# Patient Record
Sex: MALE | Race: WHITE | Employment: OTHER | URBAN - METROPOLITAN AREA
[De-identification: names, ages, dates, MRNs, and addresses within clinical notes are randomized per-mention and may not be internally consistent; named-entity substitution may affect disease eponyms.]

---

## 2019-08-08 ENCOUNTER — OFFICE VISIT (OUTPATIENT)
Dept: FAMILY MEDICINE CLINIC | Age: 74
End: 2019-08-08
Payer: MEDICARE

## 2019-08-08 VITALS
WEIGHT: 181.1 LBS | HEART RATE: 66 BPM | HEIGHT: 74 IN | SYSTOLIC BLOOD PRESSURE: 124 MMHG | OXYGEN SATURATION: 97 % | BODY MASS INDEX: 23.24 KG/M2 | DIASTOLIC BLOOD PRESSURE: 70 MMHG

## 2019-08-08 DIAGNOSIS — G25.0 BENIGN ESSENTIAL TREMOR: Primary | ICD-10-CM

## 2019-08-08 DIAGNOSIS — N40.1 BENIGN PROSTATIC HYPERPLASIA WITH URINARY OBSTRUCTION: ICD-10-CM

## 2019-08-08 DIAGNOSIS — Z86.010 HX OF ADENOMATOUS COLONIC POLYPS: ICD-10-CM

## 2019-08-08 DIAGNOSIS — N13.8 BENIGN PROSTATIC HYPERPLASIA WITH URINARY OBSTRUCTION: ICD-10-CM

## 2019-08-08 DIAGNOSIS — Z90.49 HISTORY OF RESECTION OF LARGE BOWEL: ICD-10-CM

## 2019-08-08 PROCEDURE — 1123F ACP DISCUSS/DSCN MKR DOCD: CPT | Performed by: FAMILY MEDICINE

## 2019-08-08 PROCEDURE — G8427 DOCREV CUR MEDS BY ELIG CLIN: HCPCS | Performed by: FAMILY MEDICINE

## 2019-08-08 PROCEDURE — 99203 OFFICE O/P NEW LOW 30 MIN: CPT | Performed by: FAMILY MEDICINE

## 2019-08-08 PROCEDURE — G8420 CALC BMI NORM PARAMETERS: HCPCS | Performed by: FAMILY MEDICINE

## 2019-08-08 PROCEDURE — 4040F PNEUMOC VAC/ADMIN/RCVD: CPT | Performed by: FAMILY MEDICINE

## 2019-08-08 PROCEDURE — 1036F TOBACCO NON-USER: CPT | Performed by: FAMILY MEDICINE

## 2019-08-08 PROCEDURE — 3017F COLORECTAL CA SCREEN DOC REV: CPT | Performed by: FAMILY MEDICINE

## 2019-08-08 ASSESSMENT — PATIENT HEALTH QUESTIONNAIRE - PHQ9
SUM OF ALL RESPONSES TO PHQ QUESTIONS 1-9: 0
2. FEELING DOWN, DEPRESSED OR HOPELESS: 0
1. LITTLE INTEREST OR PLEASURE IN DOING THINGS: 0
SUM OF ALL RESPONSES TO PHQ9 QUESTIONS 1 & 2: 0
SUM OF ALL RESPONSES TO PHQ QUESTIONS 1-9: 0

## 2019-08-08 ASSESSMENT — ENCOUNTER SYMPTOMS
ABDOMINAL PAIN: 0
COUGH: 0

## 2019-08-08 NOTE — Clinical Note
Call dr Anne Buckley and get last cscope result to satisfy HM, also sravanthi next years visit as [address] VISIT please

## 2019-09-23 ENCOUNTER — OFFICE VISIT (OUTPATIENT)
Dept: FAMILY MEDICINE CLINIC | Age: 74
End: 2019-09-23
Payer: MEDICARE

## 2019-09-23 VITALS
DIASTOLIC BLOOD PRESSURE: 72 MMHG | SYSTOLIC BLOOD PRESSURE: 104 MMHG | WEIGHT: 179 LBS | BODY MASS INDEX: 22.98 KG/M2 | HEART RATE: 78 BPM | OXYGEN SATURATION: 96 %

## 2019-09-23 DIAGNOSIS — H25.013 CORTICAL AGE-RELATED CATARACT OF BOTH EYES: ICD-10-CM

## 2019-09-23 DIAGNOSIS — Z01.818 PREOPERATIVE CLEARANCE: Primary | ICD-10-CM

## 2019-09-23 PROCEDURE — G8420 CALC BMI NORM PARAMETERS: HCPCS | Performed by: FAMILY MEDICINE

## 2019-09-23 PROCEDURE — G8427 DOCREV CUR MEDS BY ELIG CLIN: HCPCS | Performed by: FAMILY MEDICINE

## 2019-09-23 PROCEDURE — 99212 OFFICE O/P EST SF 10 MIN: CPT | Performed by: FAMILY MEDICINE

## 2019-09-23 NOTE — PROGRESS NOTES
Candehaleigh Godfrey  1945 09/24/19    Chief Complaint   Patient presents with    Pre-op Exam     for ctaract surgery           Patient here for preoperative visit for cataract surgery, next wk, doing fine and has no complaints. Denies chest pain, dyspnea, edema, or TIA's.         Past Medical History:   Diagnosis Date    Benign essential tremor     COPD (chronic obstructive pulmonary disease) ([address]     per CXR    Hyperlipidemia     borderline     Past Surgical History:   Procedure Laterality Date    APPENDECTOMY      COLON SURGERY      COLONOSCOPY  4-29-14    s/p ileocecal resecectio withbx    CYSTOSCOPY  12-06-11    HYSTERECTOMY      INSERT/CHANGE LANDAVERDE CATHETER - SIMPLE  12-06-11    OTHER SURGICAL HISTORY  12-06-11    laprascopic ileocecectomy    OTHER SURGICAL HISTORY  2017    UROLIFT Dr Love Core SHOULDER ARTHROSCOPY  1999    right    TURP      x 2 (last one 2004)     Family History   Problem Relation Age of Onset    High Blood Pressure Mother     Other Father         dona gehrig's disease     Social History     Socioeconomic History    Marital status:      Spouse name: Not on file    Number of children: Not on file    Years of education: Not on file    Highest education level: Not on file   Occupational History    Not on file   Social Needs    Financial resource strain: Not on file    Food insecurity:     Worry: Not on file     Inability: Not on file    Transportation needs:     Medical: Not on file     Non-medical: Not on file   Tobacco Use    Smoking status: Never Smoker    Smokeless tobacco: Never Used   Substance and Sexual Activity    Alcohol use: No    Drug use: No    Sexual activity: Not on file   Lifestyle    Physical activity:     Days per week: Not on file     Minutes per session: Not on file    Stress: Not on file   Relationships    Social connections:     Talks on phone: Not on file     Gets together: Not on file     Attends Confucianism service: Not on file Active member of club or organization: Not on file     Attends meetings of clubs or organizations: Not on file     Relationship status: Not on file    Intimate partner violence:     Fear of current or ex partner: Not on file     Emotionally abused: Not on file     Physically abused: Not on file     Forced sexual activity: Not on file   Other Topics Concern    Not on file   Social History Narrative    Not on file       No Known Allergies  Current Outpatient Medications   Medication Sig Dispense Refill    Dutasteride-Tamsulosin HCl (RAVI) 0.5-0.4 MG CAPS Take 1 tablet by mouth daily.  therapeutic multivitamin-minerals (THERAGRAN-M) tablet Take 1 tablet by mouth daily. Last dose 11/28/11. No current facility-administered medications for this visit. Review of Systems   Constitutional: Negative for activity change, appetite change, chills, fatigue and fever. HENT: Negative for congestion, postnasal drip, sinus pressure and sore throat. Respiratory: Negative for cough, chest tightness, shortness of breath and wheezing. Cardiovascular: Negative for chest pain and leg swelling. Gastrointestinal: Negative for abdominal pain, constipation and diarrhea. Genitourinary: Negative for dysuria and frequency. Musculoskeletal: Negative for back pain and gait problem. Skin: Negative for rash. Neurological: Negative for dizziness, weakness and headaches. Psychiatric/Behavioral: Negative for agitation and behavioral problems. The patient is not nervous/anxious.         Lab Results   Component Value Date    WBC 8.2 12/11/2011    HGB 13.5 12/11/2011    HCT 40.4 (L) 12/11/2011    MCV 88.2 12/11/2011     12/11/2011     Lab Results   Component Value Date     12/07/2011    K 4.2 12/07/2011     12/07/2011    CO2 28 12/07/2011    BUN 17 12/07/2011    CREATININE 0.9 12/07/2011    GLUCOSE 125 12/07/2011    CALCIUM 8.4 12/07/2011    LABGLOM >60 12/07/2011    GFRAA >60 12/07/2011

## 2019-09-24 ASSESSMENT — ENCOUNTER SYMPTOMS
SHORTNESS OF BREATH: 0
BACK PAIN: 0
SINUS PRESSURE: 0
CONSTIPATION: 0
CHEST TIGHTNESS: 0
ABDOMINAL PAIN: 0
DIARRHEA: 0
WHEEZING: 0
SORE THROAT: 0
COUGH: 0

## 2020-08-13 ENCOUNTER — OFFICE VISIT (OUTPATIENT)
Dept: FAMILY MEDICINE CLINIC | Age: 75
End: 2020-08-13
Payer: MEDICARE

## 2020-08-13 VITALS
WEIGHT: 176 LBS | DIASTOLIC BLOOD PRESSURE: 82 MMHG | SYSTOLIC BLOOD PRESSURE: 122 MMHG | OXYGEN SATURATION: 94 % | BODY MASS INDEX: 22.6 KG/M2 | HEART RATE: 81 BPM

## 2020-08-13 PROCEDURE — 4040F PNEUMOC VAC/ADMIN/RCVD: CPT | Performed by: FAMILY MEDICINE

## 2020-08-13 PROCEDURE — 1123F ACP DISCUSS/DSCN MKR DOCD: CPT | Performed by: FAMILY MEDICINE

## 2020-08-13 PROCEDURE — 3017F COLORECTAL CA SCREEN DOC REV: CPT | Performed by: FAMILY MEDICINE

## 2020-08-13 PROCEDURE — G0438 PPPS, INITIAL VISIT: HCPCS | Performed by: FAMILY MEDICINE

## 2020-08-13 RX ORDER — ZOSTER VACCINE RECOMBINANT, ADJUVANTED 50 MCG/0.5
0.5 KIT INTRAMUSCULAR SEE ADMIN INSTRUCTIONS
Qty: 0.5 ML | Refills: 0 | Status: SHIPPED | OUTPATIENT
Start: 2020-08-13 | End: 2021-02-09

## 2020-08-13 ASSESSMENT — LIFESTYLE VARIABLES: HOW OFTEN DO YOU HAVE A DRINK CONTAINING ALCOHOL: 0

## 2020-08-13 ASSESSMENT — PATIENT HEALTH QUESTIONNAIRE - PHQ9
SUM OF ALL RESPONSES TO PHQ QUESTIONS 1-9: 0
SUM OF ALL RESPONSES TO PHQ QUESTIONS 1-9: 0

## 2020-08-13 NOTE — PATIENT INSTRUCTIONS
Personalized Preventive Plan for Eddye Moder - 8/13/2020  Medicare offers a range of preventive health benefits. Some of the tests and screenings are paid in full while other may be subject to a deductible, co-insurance, and/or copay. Some of these benefits include a comprehensive review of your medical history including lifestyle, illnesses that may run in your family, and various assessments and screenings as appropriate. After reviewing your medical record and screening and assessments performed today your provider may have ordered immunizations, labs, imaging, and/or referrals for you. A list of these orders (if applicable) as well as your Preventive Care list are included within your After Visit Summary for your review. Other Preventive Recommendations:    · A preventive eye exam performed by an eye specialist is recommended every 1-2 years to screen for glaucoma; cataracts, macular degeneration, and other eye disorders. · A preventive dental visit is recommended every 6 months. · Try to get at least 150 minutes of exercise per week or 10,000 steps per day on a pedometer . · Order or download the FREE \"Exercise & Physical Activity: Your Everyday Guide\" from [employer]. Call 6-121.413.2854 or search [employer] online. · You need 4928-8100 mg of calcium and 0979-8866 IU of vitamin D per day. It is possible to meet your calcium requirement with diet alone, but a vitamin D supplement is usually necessary to meet this goal.  · When exposed to the sun, use a sunscreen that protects against both UVA and UVB radiation with an SPF of 30 or greater. Reapply every 2 to 3 hours or after sweating, drying off with a towel, or swimming. · Always wear a seat belt when traveling in a car. Always wear a helmet when riding a bicycle or motorcycle.

## 2020-08-13 NOTE — PROGRESS NOTES
Medicare Annual Wellness Visit  Name: Leonides Coles Date: 2020   MRN: [id number] Sex: Male   Age: 69 y.o. Ethnicity: Non-/Non    : 1945 Race: Esha Santana is here for Medicare AWV and Otalgia (mostly left ear with pressure)    Screenings for behavioral, psychosocial and functional/safety risks, and cognitive dysfunction are all negative except as indicated below. These results, as well as other patient data from the [address] form, are documented in Flowsheets linked to this Encounter. Pertinent Positives: unintentional weight loss; living will on file. Due for pneumovax. No Known Allergies      Prior to Visit Medications    Medication Sig Taking? Authorizing Provider   zoster recombinant adjuvanted vaccine [facility]) 50 MCG/0.5ML SUSR injection Inject 0.5 mLs into the muscle See Admin Instructions 1 dose now and repeat in 2-6 months Yes Stacie Driscoll MD   Dutasteride-Tamsulosin HCl (RAVI) 0.5-0.4 MG CAPS Take 1 tablet by mouth daily. Yes Historical Provider, MD   therapeutic multivitamin-minerals (THERAGRAN-M) tablet Take 1 tablet by mouth daily. Last dose 11.   Yes Historical Provider, MD         Past Medical History:   Diagnosis Date    Benign essential tremor     [address] (chronic obstructive pulmonary disease) ([facility]     per CXR    Hyperlipidemia     borderline       Past Surgical History:   Procedure Laterality Date    APPENDECTOMY      COLON SURGERY      COLONOSCOPY  14    s/p ileocecal resecectio withbx    CYSTOSCOPY  11    HYSTERECTOMY      INSERT/CHANGE LANDAVERDE CATHETER - SIMPLE  11    OTHER SURGICAL HISTORY  11    laprascopic ileocecectomy    OTHER SURGICAL HISTORY  2017    UROLIFT Dr Hilda Garrison SHOULDER ARTHROSCOPY      right    TURP      x 2 (last one )         Family History   Problem Relation Age of Onset    High Blood Pressure Mother     Other Father         dona gehrig's disease CareTeam (Including outside providers/suppliers regularly involved in providing care):   Patient Care Team:  Reina Henry MD as PCP - General (Family Medicine)  Reina Henry MD as PCP - [facility] Provider    Wt Readings from Last 3 Encounters:   08/13/20 176 lb (79.8 kg)   09/23/19 179 lb (81.2 kg)   08/08/19 181 lb 1.6 oz (82.1 kg)     Vitals:    08/13/20 1327   BP: 122/82   Site: Left Upper Arm   Position: Sitting   Cuff Size: Medium Adult   Pulse: 81   SpO2: 94%   Weight: 176 lb (79.8 kg)     Body mass index is 22.6 kg/m². Based upon direct observation of the patient, evaluation of cognition reveals recent and remote memory intact. Patient's complete Health Risk Assessment and screening values have been reviewed and are found in Flowsheets. The following problems were reviewed today and where indicated follow up appointments were made and/or referrals ordered. Positive Risk Factor Screenings with Interventions:     Health Habits/Nutrition:  Health Habits/Nutrition  Do you exercise for at least 20 minutes 2-3 times per week?: Yes  Have you lost any weight without trying in the past 3 months?: (!) Yes  Do you eat fewer than 2 meals per day?: No  Have you seen a dentist within the past year?: Yes  Body mass index is 22.6 kg/m².   Health Habits/Nutrition Interventions:  · Nutritional issues:  educational materials to promote weight loss provided    Hearing/Vision:  No exam data present  Hearing/Vision  Do you or your family notice any trouble with your hearing?: (!) Yes  Do you have difficulty driving, watching TV, or doing any of your daily activities because of your eyesight?: No  Have you had an eye exam within the past year?: Yes  Hearing/Vision Interventions:  · Hearing concerns:  offerred referral to audiology    Personalized Preventive Plan   Current Health Maintenance Status  Immunization History   Administered Date(s) Administered    Pneumococcal Polysaccharide ([id number]) 08/17/2020

## 2020-08-17 PROCEDURE — 90732 PPSV23 VACC 2 YRS+ SUBQ/IM: CPT | Performed by: FAMILY MEDICINE

## 2020-08-17 PROCEDURE — G0009 ADMIN PNEUMOCOCCAL VACCINE: HCPCS | Performed by: FAMILY MEDICINE

## 2021-05-25 ENCOUNTER — OFFICE VISIT (OUTPATIENT)
Dept: FAMILY MEDICINE CLINIC | Age: 76
End: 2021-05-25
Payer: MEDICARE

## 2021-05-25 VITALS
SYSTOLIC BLOOD PRESSURE: 126 MMHG | DIASTOLIC BLOOD PRESSURE: 80 MMHG | BODY MASS INDEX: 23.11 KG/M2 | HEART RATE: 80 BPM | OXYGEN SATURATION: 96 % | WEIGHT: 180 LBS

## 2021-05-25 DIAGNOSIS — H68.012 EUSTACHIAN SALPINGITIS, ACUTE, LEFT: ICD-10-CM

## 2021-05-25 DIAGNOSIS — H93.8X2 EAR FULLNESS, LEFT: Primary | ICD-10-CM

## 2021-05-25 DIAGNOSIS — Z12.11 SCREENING FOR COLON CANCER: ICD-10-CM

## 2021-05-25 PROCEDURE — 1036F TOBACCO NON-USER: CPT | Performed by: FAMILY MEDICINE

## 2021-05-25 PROCEDURE — G8420 CALC BMI NORM PARAMETERS: HCPCS | Performed by: FAMILY MEDICINE

## 2021-05-25 PROCEDURE — 3017F COLORECTAL CA SCREEN DOC REV: CPT | Performed by: FAMILY MEDICINE

## 2021-05-25 PROCEDURE — 99213 OFFICE O/P EST LOW 20 MIN: CPT | Performed by: FAMILY MEDICINE

## 2021-05-25 PROCEDURE — 4040F PNEUMOC VAC/ADMIN/RCVD: CPT | Performed by: FAMILY MEDICINE

## 2021-05-25 PROCEDURE — 1123F ACP DISCUSS/DSCN MKR DOCD: CPT | Performed by: FAMILY MEDICINE

## 2021-05-25 PROCEDURE — G8427 DOCREV CUR MEDS BY ELIG CLIN: HCPCS | Performed by: FAMILY MEDICINE

## 2021-05-25 RX ORDER — FLUTICASONE PROPIONATE 50 MCG
2 SPRAY, SUSPENSION (ML) NASAL DAILY
Qty: 3 BOTTLE | Refills: 1 | Status: SHIPPED | OUTPATIENT
Start: 2021-05-25

## 2021-05-25 RX ORDER — FLUTICASONE PROPIONATE 50 MCG
2 SPRAY, SUSPENSION (ML) NASAL DAILY
Qty: 3 BOTTLE | Refills: 1 | Status: SHIPPED | OUTPATIENT
Start: 2021-05-25 | End: 2021-05-25 | Stop reason: SDUPTHER

## 2021-05-25 ASSESSMENT — PATIENT HEALTH QUESTIONNAIRE - PHQ9
SUM OF ALL RESPONSES TO PHQ QUESTIONS 1-9: 0
1. LITTLE INTEREST OR PLEASURE IN DOING THINGS: 0
2. FEELING DOWN, DEPRESSED OR HOPELESS: 0
SUM OF ALL RESPONSES TO PHQ QUESTIONS 1-9: 0
SUM OF ALL RESPONSES TO PHQ9 QUESTIONS 1 & 2: 0
SUM OF ALL RESPONSES TO PHQ QUESTIONS 1-9: 0

## 2021-05-25 NOTE — PROGRESS NOTES
Chief Complaint   Patient presents with    Otalgia     left ear x 3 weeks pressure and muffled hearing        [place] NARRATIVE:    Pressure without sharp pain. No purulent drainage from nose or ears nor cough nor sneezing. No fever. Patient is established unless otherwise noted. Above chief complaint and [place] obtained by this physician provider. Review of Systems   Constitutional: Negative for activity change, chills, fatigue and fever. HENT: Positive for hearing loss. Negative for congestion and ear pain (left ear pressure though). Respiratory: Negative for cough, chest tightness, shortness of breath and wheezing. Cardiovascular: Negative for chest pain and leg swelling. Gastrointestinal: Negative for abdominal pain. Musculoskeletal: Negative for back pain and myalgias. Skin: Negative for rash. Psychiatric/Behavioral: Negative for behavioral problems and dysphoric mood. No Known Allergies  Allergy historyupdated. Outpatient Medications Marked as Taking for the 5/25/21 encounter (Office Visit) with Jose Deshpande MD   Medication Sig Dispense Refill    fluticasone (FLONASE) 50 MCG/ACT nasal spray 2 sprays by Each Nostril route daily 3 Bottle 1    Dutasteride-Tamsulosin HCl (RAVI) 0.5-0.4 MG CAPS Take 1 tablet by mouth daily.  therapeutic multivitamin-minerals (THERAGRAN-M) tablet Take 1 tablet by mouth daily. Last dose 11/28/11. Tobacco use history updated. Social History     Tobacco Use   Smoking Status Never Smoker   Smokeless Tobacco Never Used        Nursing note reviewed.     Vitals:    05/25/21 1326   BP: 126/80   Site: Left Upper Arm   Position: Sitting   Cuff Size: Medium Adult   Pulse: 80   SpO2: 96%   Weight: 180 lb (81.6 kg)          BP Readings from Last 3 Encounters:   05/25/21 126/80   08/13/20 122/82   09/23/19 104/72     Wt Readings from Last 3 Encounters:   05/25/21 180 lb (81.6 kg)   08/13/20 176 lb (79.8 kg)   09/23/19 179 lb (81.2 kg)     Body mass index is 23.11 kg/m². No results found for this visit on 05/25/21. Physical Exam  Vitals and nursing note reviewed. Constitutional:       General: He is not in acute distress. Appearance: He is well-developed. He is not diaphoretic. HENT:      Head: Normocephalic and atraumatic. Right Ear: Tympanic membrane and ear canal normal. There is no impacted cerumen. Left Ear: Tympanic membrane and ear canal normal. There is no impacted cerumen. Nose: Congestion present. No rhinorrhea. Eyes:      Conjunctiva/sclera: Conjunctivae normal.      Pupils: Pupils are equal, round, and reactive to light. Cardiovascular:      Rate and Rhythm: Normal rate and regular rhythm. Heart sounds: Normal heart sounds. No murmur heard. No friction rub. Pulmonary:      Effort: Pulmonary effort is normal. No respiratory distress. Breath sounds: Normal breath sounds. No wheezing. Skin:     General: Skin is warm and dry. Neurological:      Mental Status: He is alert and oriented to person, place, and time. _________________________________________________  Assessment:     1. Ear fullness, left  -     fluticasone (FLONASE) 50 MCG/ACT nasal spray; 2 sprays by Each Nostril route daily, Disp-3 Bottle, R-1Print  2. Screening for colon cancer  -     POCT FECAL IMMUNOCHEMICAL TEST (FIT); Future  3. Eustachian salpingitis, acute, left  -     fluticasone (FLONASE) 50 MCG/ACT nasal spray; 2 sprays by Each Nostril route daily, Disp-3 Bottle, R-1Print    I suspect eustachian tube dysfunction as he has some congestion and mucosal edema without purulent drainage or signs of infection. We will try a simple round of Flonase, alternatives include oral prednisone which she declined for today but may call if not getting good benefit from no spray. Fit test is provided as he is overdue for that as well.     Problems listed above are stable and therapeutic plan is unchanged unless otherwise specified. See orders above and comments below for details of workup or medication orders. _________________________________________________  Plan:     Return if symptoms worsen or fail to improve.       Electronically signed by Lauren Olmedo MD on 5/25/21 at 1:28 PM EDT

## 2021-05-25 NOTE — PATIENT INSTRUCTIONS
Patient Education        Eustachian Tube Problems: Care Instructions  Your Care Instructions     The eustachian (say \"you-STAY-shee-un\") tubes run between the inside of the ears and the throat. They keep air pressure stable in the ears. If your eustachian tubes become blocked, the air pressure in your ears changes. The fluids from a cold can clog eustachian tubes, causing pain in the ears. A quick change in air pressure can cause eustachian tubes to close up. This might happen when an airplane changes altitude or when a  goes up or down underwater. Eustachian tube problems often clear up on their own or after antibiotic treatment. If your tubes continue to be blocked, you may need surgery. Follow-up care is a key part of your treatment and safety. Be sure to make and go to all appointments, and call your doctor if you are having problems. It's also a good idea to know your test results and keep a list of the medicines you take. How can you care for yourself at home? · To ease ear pain, apply a warm washcloth or a heating pad set on low. There may be some drainage from the ear when the heat melts earwax. Put a cloth between the heat source and your skin. Do not use a heating pad with children. · If your doctor prescribed antibiotics, take them as directed. Do not stop taking them just because you feel better. You need to take the full course of antibiotics. · Your doctor may recommend over-the-counter medicine. Be safe with medicines. Oral or nasal decongestants may relieve ear pain. Avoid decongestants that are combined with antihistamines, which tend to cause more blockage. But if allergies seem to be the problem, your doctor may recommend a combination. Be careful with cough and cold medicines. Don't give them to children younger than 6, because they don't work for children that age and can even be harmful. For children 6 and older, always follow all the instructions carefully.  Make sure you know how much medicine to give and how long to use it. And use the dosing device if one is included. When should you call for help? Call your doctor now or seek immediate medical care if:    · You develop sudden, complete hearing loss.     · You have severe pain or feel dizzy.     · You have new or increasing pus or blood draining from your ear.     · You have redness, swelling, or pain around or behind the ear. Watch closely for changes in your health, and be sure to contact your doctor if:    · You do not get better after 2 weeks.     · You have any new symptoms, such as itching or a feeling of fullness in the ear. Where can you learn more? Go to https://[employer].ReferralMD. org and sign in to your ownCloud account. Enter Y822 in the nuPSYS box to learn more about \"Eustachian Tube Problems: Care Instructions. \"     If you do not have an account, please click on the \"Sign Up Now\" link. Current as of: December 2, 2020               Content Version: 12.8  © 7572-1217 Healthwise, Incorporated. Care instructions adapted under license by [facility] ([facility]). If you have questions about a medical condition or this instruction, always ask your healthcare professional. [place] [address] any warranty or liability for your use of this information.

## 2021-06-21 ASSESSMENT — ENCOUNTER SYMPTOMS
WHEEZING: 0
CHEST TIGHTNESS: 0
COUGH: 0
BACK PAIN: 0
ABDOMINAL PAIN: 0
SHORTNESS OF BREATH: 0

## 2021-07-23 ENCOUNTER — TELEPHONE (OUTPATIENT)
Dept: FAMILY MEDICINE CLINIC | Age: 76
End: 2021-07-23

## 2021-07-23 DIAGNOSIS — H68.012 EUSTACHIAN SALPINGITIS, ACUTE, LEFT: ICD-10-CM

## 2021-07-23 DIAGNOSIS — H92.02 OTALGIA OF LEFT EAR: Primary | ICD-10-CM

## 2021-07-29 ENCOUNTER — TELEPHONE (OUTPATIENT)
Dept: FAMILY MEDICINE CLINIC | Age: 76
End: 2021-07-29

## 2021-08-18 DIAGNOSIS — Z12.11 SCREENING FOR COLON CANCER: ICD-10-CM

## 2021-08-18 LAB
CONTROL: NORMAL
HEMOCCULT STL QL: NORMAL

## 2021-08-19 ENCOUNTER — OFFICE VISIT (OUTPATIENT)
Dept: FAMILY MEDICINE CLINIC | Age: 76
End: 2021-08-19
Payer: MEDICARE

## 2021-08-19 VITALS
DIASTOLIC BLOOD PRESSURE: 82 MMHG | SYSTOLIC BLOOD PRESSURE: 130 MMHG | HEIGHT: 74 IN | BODY MASS INDEX: 23.72 KG/M2 | HEART RATE: 75 BPM | WEIGHT: 184.8 LBS | OXYGEN SATURATION: 96 %

## 2021-08-19 DIAGNOSIS — Z86.39 HISTORY OF HYPERLIPIDEMIA: ICD-10-CM

## 2021-08-19 DIAGNOSIS — Z00.00 WELL ADULT EXAM: ICD-10-CM

## 2021-08-19 DIAGNOSIS — N40.1 BENIGN PROSTATIC HYPERPLASIA WITH LOWER URINARY TRACT SYMPTOMS, SYMPTOM DETAILS UNSPECIFIED: ICD-10-CM

## 2021-08-19 DIAGNOSIS — Z00.00 ROUTINE GENERAL MEDICAL EXAMINATION AT A HEALTH CARE FACILITY: Primary | ICD-10-CM

## 2021-08-19 PROCEDURE — G0439 PPPS, SUBSEQ VISIT: HCPCS | Performed by: FAMILY MEDICINE

## 2021-08-19 PROCEDURE — 1123F ACP DISCUSS/DSCN MKR DOCD: CPT | Performed by: FAMILY MEDICINE

## 2021-08-19 PROCEDURE — 4040F PNEUMOC VAC/ADMIN/RCVD: CPT | Performed by: FAMILY MEDICINE

## 2021-08-19 PROCEDURE — 3017F COLORECTAL CA SCREEN DOC REV: CPT | Performed by: FAMILY MEDICINE

## 2021-08-19 RX ORDER — ZOSTER VACCINE RECOMBINANT, ADJUVANTED 50 MCG/0.5
0.5 KIT INTRAMUSCULAR SEE ADMIN INSTRUCTIONS
Qty: 0.5 ML | Refills: 0 | Status: SHIPPED | OUTPATIENT
Start: 2021-08-19 | End: 2022-02-15

## 2021-08-19 ASSESSMENT — PATIENT HEALTH QUESTIONNAIRE - PHQ9
SUM OF ALL RESPONSES TO PHQ QUESTIONS 1-9: 0
SUM OF ALL RESPONSES TO PHQ QUESTIONS 1-9: 0
1. LITTLE INTEREST OR PLEASURE IN DOING THINGS: 0
SUM OF ALL RESPONSES TO PHQ9 QUESTIONS 1 & 2: 0
2. FEELING DOWN, DEPRESSED OR HOPELESS: 0
SUM OF ALL RESPONSES TO PHQ QUESTIONS 1-9: 0

## 2021-08-19 ASSESSMENT — LIFESTYLE VARIABLES: HOW OFTEN DO YOU HAVE A DRINK CONTAINING ALCOHOL: 0

## 2021-08-19 NOTE — PROGRESS NOTES
Medicare Annual Wellness Visit  Name: Abida Solorio Date: 2021   MRN: [id number] Sex: Male   Age: 70 y.o. Ethnicity: Non- / Non    : 1945 Race: White (non-)      Monique Velez is here for Medicare AWV (Here primarily for annual wellness visit) and Other (pt does not want labs here he will be going in a few weeks for the [place] and will bring a copy of the labs then)    Screenings for behavioral, psychosocial and functional/safety risks, and cognitive dysfunction are all negative except as indicated below. These results, as well as other patient data from the [address] form, are documented in Flowsheets linked to this Encounter. Pertinent positives: No falls, negative cognitive and mood screening. No alcohol use. HRA shows no concerns. There is a living will on file. We will update medical decision maker. Generally he is a healthy gentleman who uses Everlean [place] for urinary symptoms and Flonase for allergy symptoms but no other chronic medication. Has seen derm for multiple issues, not in last year due to covid though. No Known Allergies      Prior to Visit Medications    Medication Sig Taking? Authorizing Provider   zoster recombinant adjuvanted vaccine [facility]) 50 MCG/0.5ML SUSR injection Inject 0.5 mLs into the muscle See Admin Instructions 1 dose now and repeat in 2-6 months Yes Betzaida Nesbitt MD   fluticasone (FLONASE) 50 MCG/ACT nasal spray 2 sprays by Each Nostril route daily Yes Betzaida Nesbitt MD   Dutasteride-Tamsulosin HCl (RAVI) 0.5-0.4 MG CAPS Take 1 tablet by mouth daily. Yes Historical Provider, MD   therapeutic multivitamin-minerals (THERAGRAN-M) tablet Take 1 tablet by mouth daily. Last dose 11.   Yes Historical Provider, MD         Past Medical History:   Diagnosis Date    Benign essential tremor     COPD (chronic obstructive pulmonary disease) ([facility] [address]     per CXR    Hyperlipidemia     borderline       Past Surgical History: Procedure Laterality Date    APPENDECTOMY      COLON SURGERY      COLONOSCOPY  4-29-14    s/p ileocecal resecectio withbx    CYSTOSCOPY  12-06-11    HYSTERECTOMY      INSERT/CHANGE LANDAVERDE CATHETER - SIMPLE  12-06-11    OTHER SURGICAL HISTORY  12-06-11    laprascopic ileocecectomy    OTHER SURGICAL HISTORY  2017    UROLIFT Dr Samanta Kraus SHOULDER ARTHROSCOPY  1999    right    TURP      x 2 (last one 2004)         Family History   Problem Relation Age of Onset    High Blood Pressure Mother     Other Father         dona gehrig's disease       CareTeam (Including outside providers/suppliers regularly involved in providing care):   Patient Care Team:  Inessa Gutierrez MD as PCP - General (Family Medicine)  Inessa Gutierrez MD as PCP [facility]    Wt Readings from Last 3 Encounters:   08/19/21 184 lb 12.8 oz (83.8 kg)   05/25/21 180 lb (81.6 kg)   08/13/20 176 lb (79.8 kg)     Vitals:    08/19/21 1323   BP: 130/82   Site: Left Upper Arm   Position: Sitting   Cuff Size: Medium Adult   Pulse: 75   SpO2: 96%   Weight: 184 lb 12.8 oz (83.8 kg)   Height: 6' 2\" (1.88 m)     Body mass index is 23.73 kg/m². Based upon direct observation of the patient, evaluation of cognition reveals recent and remote memory intact. Patient's complete Health Risk Assessment and screening values have been reviewed and are found in Flowsheets. The following problems were reviewed today and where indicated follow up appointments were made and/or referrals ordered. Positive Risk Factor Screenings with Interventions:               No Positive Risk Factors identified today.     Personalized Preventive Plan   Current Health Maintenance Status  Immunization History   Administered Date(s) Administered    COVID-19, Pfizer, PF, 30mcg/0.3mL 02/02/2021, 02/23/2021    Pneumococcal Polysaccharide ([id number]) 08/17/2020        Health Maintenance   Topic Date Due    Hepatitis C screen  Never done    Shingles Vaccine (1 of 2) Never done    Lipid screen  10/26/2016    Annual Wellness Visit (AWV)  Never done    Flu vaccine (1) 09/01/2021    Colon Cancer Screen FIT/FOBT  08/17/2022    DTaP/Tdap/Td vaccine (2 - Td or Tdap) 10/31/2026    Pneumococcal 65+ years Vaccine  Completed    COVID-19 Vaccine  Completed    Hepatitis A vaccine  Aged Out    Hepatitis B vaccine  Aged Out    Hib vaccine  Aged Out    Meningococcal (ACWY) vaccine  Aged Out     Recommendations for [employer] Plans Due: see orders and patient instructions/AVS.  . Recommended screening schedule for the next 5-10 years is provided to the patient in written form: see Patient Instructions/AVS.    Abdias VILLEDA was seen today for medicare awv and other. Diagnoses and all orders for this visit:    Routine general medical examination at a health care facility    Benign prostatic hyperplasia with lower urinary tract symptoms, symptom details unspecified    History of hyperlipidemia    Well adult exam    Other orders  -     zoster recombinant adjuvanted vaccine [facility]) 50 MCG/0.5ML SUSR injection; Inject 0.5 mLs into the muscle See Admin Instructions 1 dose now and repeat in 2-6 months             I offered to draw patient's labs or give him orders. He has had elevated lipids and prostate blood test in the past.  He declines to take orders for me will instead get a copy of results from the [place] and bring to us. I told him I was worried a bit because the very old result6 we have showed cholesterol was not good and prostate blood test also abnormal, although these are almost 10 years ago.

## 2021-08-19 NOTE — PATIENT INSTRUCTIONS
Personalized Preventive Plan for Lilliana Elias - 8/19/2021  Medicare offers a range of preventive health benefits. Some of the tests and screenings are paid in full while other may be subject to a deductible, co-insurance, and/or copay. Some of these benefits include a comprehensive review of your medical history including lifestyle, illnesses that may run in your family, and various assessments and screenings as appropriate. After reviewing your medical record and screening and assessments performed today your provider may have ordered immunizations, labs, imaging, and/or referrals for you. A list of these orders (if applicable) as well as your Preventive Care list are included within your After Visit Summary for your review. Other Preventive Recommendations:    · A preventive eye exam performed by an eye specialist is recommended every 1-2 years to screen for glaucoma; cataracts, macular degeneration, and other eye disorders. · A preventive dental visit is recommended every 6 months. · Try to get at least 150 minutes of exercise per week or 10,000 steps per day on a pedometer . · Order or download the FREE \"Exercise & Physical Activity: Your Everyday Guide\" from [employer]. Call 0-367.310.4711 or search [employer] online. · You need 8342-4226 mg of calcium and 3894-4810 IU of vitamin D per day. It is possible to meet your calcium requirement with diet alone, but a vitamin D supplement is usually necessary to meet this goal.  · When exposed to the sun, use a sunscreen that protects against both UVA and UVB radiation with an SPF of 30 or greater. Reapply every 2 to 3 hours or after sweating, drying off with a towel, or swimming. · Always wear a seat belt when traveling in a car. Always wear a helmet when riding a bicycle or motorcycle.

## 2021-10-23 LAB
ALBUMIN SERPL-MCNC: 3.8 G/DL
ALP BLD-CCNC: 88 U/L
ALT SERPL-CCNC: 12 U/L
ANION GAP SERPL CALCULATED.3IONS-SCNC: 8 MMOL/L
AST SERPL-CCNC: 22 U/L
BILIRUB SERPL-MCNC: 1.2 MG/DL (ref 0.1–1.4)
BUN BLDV-MCNC: 13 MG/DL
CALCIUM SERPL-MCNC: 9.5 MG/DL
CHLORIDE BLD-SCNC: 107 MMOL/L
CHOLESTEROL, TOTAL: 235 MG/DL
CHOLESTEROL/HDL RATIO: ABNORMAL
CO2: 27 MMOL/L
CREAT SERPL-MCNC: 1 MG/DL
GFR CALCULATED: 60
GLUCOSE BLD-MCNC: 78 MG/DL
HDLC SERPL-MCNC: 47 MG/DL (ref 35–70)
LDL CHOLESTEROL CALCULATED: 165 MG/DL (ref 0–160)
NONHDLC SERPL-MCNC: 188 MG/DL
POTASSIUM SERPL-SCNC: 3.8 MMOL/L
SODIUM BLD-SCNC: 142 MMOL/L
TOTAL PROTEIN: 6.9
TRIGL SERPL-MCNC: 115 MG/DL
VLDLC SERPL CALC-MCNC: ABNORMAL MG/DL

## 2022-04-27 ENCOUNTER — TELEPHONE (OUTPATIENT)
Dept: GASTROENTEROLOGY | Age: 77
End: 2022-04-27

## 2022-05-16 LAB
ALBUMIN SERPL-MCNC: 3.7 G/DL
ALP BLD-CCNC: 82 U/L
ALT SERPL-CCNC: 10 U/L
ANION GAP SERPL CALCULATED.3IONS-SCNC: 8 MMOL/L
AST SERPL-CCNC: 25 U/L
BILIRUB SERPL-MCNC: 0.9 MG/DL (ref 0.1–1.4)
BUN BLDV-MCNC: 18 MG/DL
CALCIUM SERPL-MCNC: 9.3 MG/DL
CHLORIDE BLD-SCNC: 108 MMOL/L
CHOLESTEROL, TOTAL: 206 MG/DL
CHOLESTEROL/HDL RATIO: NORMAL
CO2: 30 MMOL/L
CREAT SERPL-MCNC: 0.9 MG/DL
GFR CALCULATED: 85
GLUCOSE BLD-MCNC: 79 MG/DL
HDLC SERPL-MCNC: 45 MG/DL (ref 35–70)
LDL CHOLESTEROL CALCULATED: 143 MG/DL (ref 0–160)
NONHDLC SERPL-MCNC: 161 MG/DL
POTASSIUM SERPL-SCNC: 4.4 MMOL/L
SODIUM BLD-SCNC: 146 MMOL/L
TOTAL PROTEIN: 6.8
TRIGL SERPL-MCNC: 86 MG/DL
VLDLC SERPL CALC-MCNC: NORMAL MG/DL

## 2022-06-15 DIAGNOSIS — Z86.010 HX OF ADENOMATOUS POLYP OF COLON: Primary | ICD-10-CM

## 2022-06-22 RX ORDER — SIMETHICONE 80 MG
TABLET,CHEWABLE ORAL
Qty: 3 TABLET | Refills: 0 | Status: SHIPPED | OUTPATIENT
Start: 2022-06-22 | End: 2022-08-22

## 2022-06-22 RX ORDER — POLYETHYLENE GLYCOL 3350, SODIUM SULFATE, SODIUM CHLORIDE, POTASSIUM CHLORIDE, ASCORBIC ACID, SODIUM ASCORBATE 140-9-5.2G
KIT ORAL
Qty: 1 EACH | Refills: 0 | Status: SHIPPED | OUTPATIENT
Start: 2022-06-22 | End: 2022-08-22

## 2022-07-01 ENCOUNTER — ANESTHESIA EVENT (OUTPATIENT)
Dept: OPERATING ROOM | Age: 77
End: 2022-07-01
Payer: MEDICARE

## 2022-07-01 NOTE — ANESTHESIA PRE PROCEDURE
Department of Anesthesiology  Preprocedure Note       Name:  Carolynn Hebert   Age:  78 y.o.  :  1945                                          MRN:  [id number]         Date:  2022      Surgeon: Holley Curry):  Glenn Webster MD    Procedure: Procedure(s):  COLONOSCOPY DIAGNOSTIC    Medications prior to admission:   Prior to Admission medications    Medication Sig Start Date End Date Taking? Authorizing Provider   PEG-KCl-NaCl-NaSulf-Na Asc-C (PLENVU) 140 g SOLR AS DIRECTED 22   Glenn Webster MD   simethicone (MYLICON) 80 MG chewable tablet AS DIRECTED 22   Glenn Webster MD   fluticasone [facility] [place]) 50 MCG/ACT nasal spray 2 sprays by Each Nostril route daily 21   Denia Brown MD   Dutasteride-Tamsulosin HCl (RAVI) 0.5-0.4 MG CAPS Take 1 tablet by mouth daily. Historical Provider, MD   therapeutic multivitamin-minerals (THERAGRAN-M) tablet Take 1 tablet by mouth daily. Last dose 11. Historical Provider, MD       Current medications:    No current facility-administered medications for this encounter. Current Outpatient Medications   Medication Sig Dispense Refill    PEG-KCl-NaCl-NaSulf-Na Asc-C (PLENVU) 140 g SOLR AS DIRECTED 1 each 0    simethicone (MYLICON) 80 MG chewable tablet AS DIRECTED 3 tablet 0    fluticasone (FLONASE) 50 MCG/ACT nasal spray 2 sprays by Each Nostril route daily 3 Bottle 1    Dutasteride-Tamsulosin HCl (RAVI) 0.5-0.4 MG CAPS Take 1 tablet by mouth daily.  therapeutic multivitamin-minerals (THERAGRAN-M) tablet Take 1 tablet by mouth daily. Last dose 11.           Allergies:  No Known Allergies    Problem List:    Patient Active Problem List   Diagnosis Code    Benign prostatic hyperplasia with urinary obstruction N40.1, N13.8    Dermatofibroma D23.9    Verruca vulgaris B07.9    Cherry angioma D18.01    Seborrheic keratosis L82.1    Nevus of multiple sites D22.9    Actinic skin damage L57.8       Past Medical History: Diagnosis Date    Benign essential tremor     COPD (chronic obstructive pulmonary disease) ([facility] [address]     per CXR    Hyperlipidemia     borderline       Past Surgical History:        Procedure Laterality Date    APPENDECTOMY      COLON SURGERY      COLONOSCOPY  4-29-14    s/p ileocecal resecectio withbx    CYSTOSCOPY  12-06-11    INSERT/CHANGE LANDAVERDE CATHETER - SIMPLE  12-06-11    OTHER SURGICAL HISTORY  12-06-11    laprascopic ileocecectomy    OTHER SURGICAL HISTORY  2017    UROLIFT Dr [address]  SHOULDER ARTHROSCOPY Left 1999    right    TURP      x 2 (last one 2004)       Social History:    Social History     Tobacco Use    Smoking status: Never Smoker    Smokeless tobacco: Never Used   Substance Use Topics    Alcohol use: No                                Counseling given: Not Answered      Vital Signs (Current):   Vitals:    06/27/22 1534   Weight: 185 lb (83.9 kg)   Height: 6' 1\" (1.854 m)                                              BP Readings from Last 3 Encounters:   08/19/21 130/82   05/25/21 126/80   08/13/20 122/82       NPO Status:                                                                                 BMI:   Wt Readings from Last 3 Encounters:   08/19/21 184 lb 12.8 oz (83.8 kg)   05/25/21 180 lb (81.6 kg)   08/13/20 176 lb (79.8 kg)     Body mass index is 24.41 kg/m².     CBC:   Lab Results   Component Value Date/Time    WBC 8.2 12/11/2011 06:28 AM    RBC 4.58 12/11/2011 06:28 AM    HGB 13.5 12/11/2011 06:28 AM    HCT 40.4 12/11/2011 06:28 AM    MCV 88.2 12/11/2011 06:28 AM    RDW 12.6 12/11/2011 06:28 AM     12/11/2011 06:28 AM       CMP:   Lab Results   Component Value Date/Time     10/23/2021 12:00 AM    K 3.8 10/23/2021 12:00 AM     10/23/2021 12:00 AM    CO2 27 10/23/2021 12:00 AM    BUN 13 10/23/2021 12:00 AM    CREATININE 1.0 10/23/2021 12:00 AM    GFRAA >60 12/07/2011 05:15 AM    LABGLOM 60 10/23/2021 12:00 AM    LABGLOM >60 12/07/2011 05:15 AM    GLUCOSE 78 10/23/2021 12:00 AM    CALCIUM 9.5 10/23/2021 12:00 AM    BILITOT 1.2 10/23/2021 12:00 AM    ALKPHOS 88 10/23/2021 12:00 AM    AST 22 10/23/2021 12:00 AM    ALT 12 10/23/2021 12:00 AM       POC Tests: No results for input(s): POCGLU, POCNA, POCK, POCCL, POCBUN, POCHEMO, POCHCT in the last 72 hours. Coags: No results found for: PROTIME, INR, APTT    HCG (If Applicable): No results found for: PREGTESTUR, PREGSERUM, HCG, HCGQUANT     ABGs: No results found for: PHART, PO2ART, PDE1AON, HXT5XCN, BEART, V3VZIZDE     Type & Screen (If Applicable):  No results found for: LABABO, LABRH    Drug/Infectious Status (If Applicable):  No results found for: HIV, HEPCAB    COVID-19 Screening (If Applicable): No results found for: COVID19        Anesthesia Evaluation  Patient summary reviewed  Airway:           Dental:          Pulmonary:   (+) COPD:                             Cardiovascular:    (+) hyperlipidemia         Beta Blocker:  Not on Beta Blocker         Neuro/Psych:   Negative Neuro/Psych ROS              GI/Hepatic/Renal:   (+) bowel prep,           Endo/Other: Negative Endo/Other ROS                    Abdominal:             Vascular: negative vascular ROS. Other Findings:           Anesthesia Plan      MAC     ASA 2       Induction: intravenous. MARIFER Jerry CRNA   7/1/2022     Pre Anesthesia Assessment complete.  Chart reviewed on 7/1/2022

## 2022-07-01 NOTE — PROGRESS NOTES
Spoke with patient's wife Meryle Beecham and patient will arrive at [phone] at [facility] on 7/5/2022 for his procedure at 1145.

## 2022-07-04 NOTE — H&P
Original H &P in soft chart. I have examined the patient immediately before the procedure and there is no change in the previous history and physical exam, which has been reviewed. There is no history of sleep apnea, snoring, or stridor. There has been no  previous adverse experience with sedation/anesthesia. There is no increased risk for aspiration of gastric contents. The patient has been instructed that all resuscitative measures (during the operative and immediate perioperative period) will be instituted in the unlikely event that they will be needed. The patient has no pertinent past surgical or family history other than listed in the original H&P. The patient was counseled about the risks of tiffanie Covid-19 during their perioperative period and any recovery window from their procedure. The patient was made aware that tiffanie Covid-19  may worsen their prognosis for recovering from their procedure  and lend to a higher morbidity and/or mortality risk. All material risks, benefits, and reasonable alternatives including postponing the procedure were discussed. The patient does wish to proceed with the procedure at this time.     ASA Class: 2  AIRWAY Class: 1

## 2022-07-05 ENCOUNTER — HOSPITAL ENCOUNTER (OUTPATIENT)
Age: 77
Setting detail: OUTPATIENT SURGERY
Discharge: HOME | End: 2022-07-05
Attending: SPECIALIST | Admitting: SPECIALIST
Payer: MEDICARE

## 2022-07-05 ENCOUNTER — ANESTHESIA (OUTPATIENT)
Dept: OPERATING ROOM | Age: 77
End: 2022-07-05
Payer: MEDICARE

## 2022-07-05 VITALS
HEART RATE: 73 BPM | OXYGEN SATURATION: 98 % | DIASTOLIC BLOOD PRESSURE: 78 MMHG | TEMPERATURE: 97.4 F | HEIGHT: 73 IN | WEIGHT: 185 LBS | SYSTOLIC BLOOD PRESSURE: 131 MMHG | RESPIRATION RATE: 16 BRPM | BODY MASS INDEX: 24.52 KG/M2

## 2022-07-05 DIAGNOSIS — D12.6 ADENOMATOUS POLYP OF COLON, UNSPECIFIED PART OF COLON: ICD-10-CM

## 2022-07-05 DIAGNOSIS — Z86.010 HISTORY OF COLON POLYPS: ICD-10-CM

## 2022-07-05 PROCEDURE — 3700000001 HC ADD 15 MINUTES (ANESTHESIA): Performed by: SPECIALIST

## 2022-07-05 PROCEDURE — 3609010600 HC COLONOSCOPY POLYPECTOMY SNARE/COLD BIOPSY: Performed by: SPECIALIST

## 2022-07-05 PROCEDURE — 6360000002 HC RX W HCPCS: Performed by: NURSE ANESTHETIST, CERTIFIED REGISTERED

## 2022-07-05 PROCEDURE — 3700000000 HC ANESTHESIA ATTENDED CARE: Performed by: SPECIALIST

## 2022-07-05 PROCEDURE — 45385 COLONOSCOPY W/LESION REMOVAL: CPT | Performed by: SPECIALIST

## 2022-07-05 PROCEDURE — 2709999900 HC NON-CHARGEABLE SUPPLY: Performed by: SPECIALIST

## 2022-07-05 PROCEDURE — 7100000010 HC PHASE II RECOVERY - FIRST 15 MIN: Performed by: SPECIALIST

## 2022-07-05 PROCEDURE — 7100000011 HC PHASE II RECOVERY - ADDTL 15 MIN: Performed by: SPECIALIST

## 2022-07-05 PROCEDURE — 2580000003 HC RX 258: Performed by: SPECIALIST

## 2022-07-05 PROCEDURE — 88305 TISSUE EXAM BY PATHOLOGIST: CPT | Performed by: PATHOLOGY

## 2022-07-05 RX ORDER — SODIUM CHLORIDE, SODIUM LACTATE, POTASSIUM CHLORIDE, CALCIUM CHLORIDE 600; 310; 30; 20 MG/100ML; MG/100ML; MG/100ML; MG/100ML
INJECTION, SOLUTION INTRAVENOUS CONTINUOUS
Status: DISCONTINUED | OUTPATIENT
Start: 2022-07-05 | End: 2022-07-05 | Stop reason: HOSPADM

## 2022-07-05 RX ORDER — PROPOFOL 10 MG/ML
INJECTION, EMULSION INTRAVENOUS PRN
Status: DISCONTINUED | OUTPATIENT
Start: 2022-07-05 | End: 2022-07-05 | Stop reason: SDUPTHER

## 2022-07-05 RX ADMIN — SODIUM CHLORIDE, POTASSIUM CHLORIDE, SODIUM LACTATE AND CALCIUM CHLORIDE: 600; 310; 30; 20 INJECTION, SOLUTION INTRAVENOUS at 10:52

## 2022-07-05 RX ADMIN — PROPOFOL 500 MG: 10 INJECTION, EMULSION INTRAVENOUS at 11:50

## 2022-07-05 ASSESSMENT — PAIN SCALES - GENERAL: PAINLEVEL_OUTOF10: 0

## 2022-07-05 ASSESSMENT — PAIN - FUNCTIONAL ASSESSMENT: PAIN_FUNCTIONAL_ASSESSMENT: 0-10

## 2022-07-05 NOTE — BRIEF OP NOTE
BRIEF COLONOSCOPY REPORT:   Photos and full colonoscopy report available by going to \"chart review\" then \"procedures\" then  \"colonoscopy\" then \"View Report\"      IMPRESSION :   1) S/P partial ileocecal resection  2) 3 mm polyp removed from the upper ascending colon with the cold snare  3) 3 mm polyp removed from the splenic flexure with the cold snare  4) small internal hemorrhoids  5) otherwise normal but very angulated colon    PLAN : Colonoscopy in 5 years

## 2022-07-05 NOTE — ANESTHESIA POSTPROCEDURE EVALUATION
Department of Anesthesiology  Postprocedure Note    Patient: Aditi Medeiros  MRN: [id number]  YOB: 1945  Date of evaluation: 7/5/2022      Procedure Summary     Date: 07/05/22 Room / Location: [address]    Anesthesia Start: 2444 Anesthesia Stop: 9360    Procedure: COLONOSCOPY POLYPECTOMY SNARE/COLD BIOPSY (N/A ) Diagnosis:       Adenomatous polyp of colon, unspecified part of colon      History of colon polyps      (Adenomatous polyp of colon, unspecified part of colon [D12.6] History of colon polyps [Z86.010])    Surgeons: Sixto Odonnell MD Responsible Provider: MARIFER Koch CRNA    Anesthesia Type: MAC ASA Status: 2          Anesthesia Type: No value filed.     Chris Phase I:      Chris Phase II:        Anesthesia Post Evaluation    Patient location during evaluation: bedside  Patient participation: complete - patient participated  Level of consciousness: awake and alert  Pain score: 0  Airway patency: patent  Nausea & Vomiting: no vomiting and no nausea  Complications: no  Cardiovascular status: blood pressure returned to baseline and hemodynamically stable  Respiratory status: acceptable, room air, spontaneous ventilation and nonlabored ventilation  Hydration status: stable

## 2022-07-05 NOTE — PROGRESS NOTES
Returned to room Q2. Report received from [facility]. Alert and oriented. Respirations even and unlabored. Color pink. Abdomen soft and nontender. Beverage provided. Call light in reach.

## 2022-07-05 NOTE — ANESTHESIA PRE PROCEDURE
Department of Anesthesiology  Preprocedure Note       Name:  Shirley Crigler   Age:  78 y.o.  :  1945                                          MRN:  [id number]         Date:  2022      Surgeon: Raulito More):  Lazarus Divers, MD    Procedure: Procedure(s):  COLONOSCOPY DIAGNOSTIC    Medications prior to admission:   Prior to Admission medications    Medication Sig Start Date End Date Taking? Authorizing Provider   PEG-KCl-NaCl-NaSulf-Na Asc-C (PLENVU) 140 g SOLR AS DIRECTED 22   Lazarus Divers, MD   simethicone (MYLICON) 80 MG chewable tablet AS DIRECTED 22   Lazarus Divers, MD   fluticasone [facility]) 50 MCG/ACT nasal spray 2 sprays by Each Nostril route daily 21   Vince Aguilar MD   Dutasteride-Tamsulosin HCl (RAVI) 0.5-0.4 MG CAPS Take 1 tablet by mouth daily. Historical Provider, MD   therapeutic multivitamin-minerals (THERAGRAN-M) tablet Take 1 tablet by mouth daily. Last dose 11. Historical Provider, MD       Current medications:    No current outpatient medications on file. No current facility-administered medications for this visit.        Allergies:  No Known Allergies    Problem List:    Patient Active Problem List   Diagnosis Code    Benign prostatic hyperplasia with urinary obstruction N40.1, N13.8    Dermatofibroma D23.9    Verruca vulgaris B07.9    Cherry angioma D18.01    Seborrheic keratosis L82.1    Nevus of multiple sites D22.9    Actinic skin damage L57.8       Past Medical History:        Diagnosis Date    Benign essential tremor     COPD (chronic obstructive pulmonary disease) ([facility] [address]     per CXR    Hyperlipidemia     borderline       Past Surgical History:        Procedure Laterality Date    APPENDECTOMY      COLON SURGERY      COLONOSCOPY  14    s/p ileocecal resecectio withbx    CYSTOSCOPY  11    INSERT/CHANGE LANDAVERDE CATHETER - SIMPLE  11    OTHER SURGICAL HISTORY  11    laprascopic ileocecectomy    OTHER SURGICAL HISTORY  2017    UROLIFT Dr [place]len [facility]ett    right    TURP      x 2 (last one 2004)       Social History:    Social History     Tobacco Use    Smoking status: Never Smoker    Smokeless tobacco: Never Used   Substance Use Topics    Alcohol use: No                                Counseling given: Not Answered      Vital Signs (Current): There were no vitals filed for this visit. BP Readings from Last 3 Encounters:   08/19/21 130/82   05/25/21 126/80   08/13/20 122/82       NPO Status:                                                                                 BMI:   Wt Readings from Last 3 Encounters:   06/27/22 185 lb (83.9 kg)   08/19/21 184 lb 12.8 oz (83.8 kg)   05/25/21 180 lb (81.6 kg)     There is no height or weight on file to calculate BMI.    CBC:   Lab Results   Component Value Date/Time    WBC 8.2 12/11/2011 06:28 AM    RBC 4.58 12/11/2011 06:28 AM    HGB 13.5 12/11/2011 06:28 AM    HCT 40.4 12/11/2011 06:28 AM    MCV 88.2 12/11/2011 06:28 AM    RDW 12.6 12/11/2011 06:28 AM     12/11/2011 06:28 AM       CMP:   Lab Results   Component Value Date/Time     10/23/2021 12:00 AM    K 3.8 10/23/2021 12:00 AM     10/23/2021 12:00 AM    CO2 27 10/23/2021 12:00 AM    BUN 13 10/23/2021 12:00 AM    CREATININE 1.0 10/23/2021 12:00 AM    GFRAA >60 12/07/2011 05:15 AM    LABGLOM 60 10/23/2021 12:00 AM    LABGLOM >60 12/07/2011 05:15 AM    GLUCOSE 78 10/23/2021 12:00 AM    CALCIUM 9.5 10/23/2021 12:00 AM    BILITOT 1.2 10/23/2021 12:00 AM    ALKPHOS 88 10/23/2021 12:00 AM    AST 22 10/23/2021 12:00 AM    ALT 12 10/23/2021 12:00 AM       POC Tests: No results for input(s): POCGLU, POCNA, POCK, POCCL, POCBUN, POCHEMO, POCHCT in the last 72 hours.     Coags: No results found for: PROTIME, INR, APTT    HCG (If Applicable): No results found for: PREGTESTUR, PREGSERUM, HCG, HCGQUANT     ABGs: No results found for: PHART, PO2ART, KVJ5NLX, MED2MSM, BEART, O5XXKFWO     Type & Screen (If Applicable):  No results found for: LABABO, LABRH    Drug/Infectious Status (If Applicable):  No results found for: HIV, HEPCAB    COVID-19 Screening (If Applicable): No results found for: COVID19        Anesthesia Evaluation  Patient summary reviewed  Airway: Mallampati: II  TM distance: >3 FB   Neck ROM: full  Mouth opening: > = 3 FB   Dental:          Pulmonary:normal exam    (+) COPD:                             Cardiovascular:  Exercise tolerance: good (>4 METS),   (+) hyperlipidemia        Rhythm: regular  Rate: normal           Beta Blocker:  Not on Beta Blocker         Neuro/Psych:   Negative Neuro/Psych ROS              GI/Hepatic/Renal: Neg GI/Hepatic/Renal ROS  (+) bowel prep,           Endo/Other: Negative Endo/Other ROS                    Abdominal:             Vascular: negative vascular ROS. Other Findings:             Anesthesia Plan      MAC     ASA 2       Induction: intravenous. Anesthetic plan and risks discussed with patient. Plan discussed with CRNA. MARIFER Bradley - CRNA   7/5/2022     Pre Anesthesia Assessment complete.  Chart reviewed on 7/5/2022

## 2022-07-05 NOTE — PROGRESS NOTES
Received report from Sriram Barragan prior to transfer to endo unit. He is alert and oriented, drank all of prep and has been NPO appropriate amount of time. He takes no beta blockers or blood thinners.  Consents are signed

## 2022-08-21 SDOH — HEALTH STABILITY: PHYSICAL HEALTH: ON AVERAGE, HOW MANY MINUTES DO YOU ENGAGE IN EXERCISE AT THIS LEVEL?: 20 MIN

## 2022-08-21 SDOH — HEALTH STABILITY: PHYSICAL HEALTH: ON AVERAGE, HOW MANY DAYS PER WEEK DO YOU ENGAGE IN MODERATE TO STRENUOUS EXERCISE (LIKE A BRISK WALK)?: 2 DAYS

## 2022-08-21 ASSESSMENT — PATIENT HEALTH QUESTIONNAIRE - PHQ9
SUM OF ALL RESPONSES TO PHQ9 QUESTIONS 1 & 2: 0
1. LITTLE INTEREST OR PLEASURE IN DOING THINGS: 0
SUM OF ALL RESPONSES TO PHQ QUESTIONS 1-9: 0
SUM OF ALL RESPONSES TO PHQ QUESTIONS 1-9: 0
2. FEELING DOWN, DEPRESSED OR HOPELESS: 0
SUM OF ALL RESPONSES TO PHQ QUESTIONS 1-9: 0
SUM OF ALL RESPONSES TO PHQ QUESTIONS 1-9: 0

## 2022-08-21 ASSESSMENT — LIFESTYLE VARIABLES
HOW OFTEN DO YOU HAVE A DRINK CONTAINING ALCOHOL: 1
HOW MANY STANDARD DRINKS CONTAINING ALCOHOL DO YOU HAVE ON A TYPICAL DAY: PATIENT DOES NOT DRINK
HOW MANY STANDARD DRINKS CONTAINING ALCOHOL DO YOU HAVE ON A TYPICAL DAY: 0
HOW OFTEN DO YOU HAVE SIX OR MORE DRINKS ON ONE OCCASION: 1
HOW OFTEN DO YOU HAVE A DRINK CONTAINING ALCOHOL: NEVER

## 2022-08-22 ENCOUNTER — OFFICE VISIT (OUTPATIENT)
Dept: FAMILY MEDICINE CLINIC | Age: 77
End: 2022-08-22
Payer: MEDICARE

## 2022-08-22 VITALS
BODY MASS INDEX: 24.78 KG/M2 | HEART RATE: 73 BPM | OXYGEN SATURATION: 94 % | WEIGHT: 187 LBS | SYSTOLIC BLOOD PRESSURE: 110 MMHG | DIASTOLIC BLOOD PRESSURE: 62 MMHG | HEIGHT: 73 IN

## 2022-08-22 DIAGNOSIS — Z00.00 MEDICARE ANNUAL WELLNESS VISIT, SUBSEQUENT: Primary | ICD-10-CM

## 2022-08-22 DIAGNOSIS — H91.92 HEARING LOSS OF LEFT EAR, UNSPECIFIED HEARING LOSS TYPE: ICD-10-CM

## 2022-08-22 DIAGNOSIS — R25.1 TREMOR: ICD-10-CM

## 2022-08-22 DIAGNOSIS — H61.22 CERUMEN DEBRIS ON TYMPANIC MEMBRANE, LEFT: ICD-10-CM

## 2022-08-22 DIAGNOSIS — H93.8X2 EAR FULLNESS, LEFT: ICD-10-CM

## 2022-08-22 PROCEDURE — G0439 PPPS, SUBSEQ VISIT: HCPCS | Performed by: FAMILY MEDICINE

## 2022-08-22 PROCEDURE — 69210 REMOVE IMPACTED EAR WAX UNI: CPT | Performed by: FAMILY MEDICINE

## 2022-08-22 PROCEDURE — 1036F TOBACCO NON-USER: CPT | Performed by: FAMILY MEDICINE

## 2022-08-22 PROCEDURE — G8427 DOCREV CUR MEDS BY ELIG CLIN: HCPCS | Performed by: FAMILY MEDICINE

## 2022-08-22 PROCEDURE — 1123F ACP DISCUSS/DSCN MKR DOCD: CPT | Performed by: FAMILY MEDICINE

## 2022-08-22 PROCEDURE — 3017F COLORECTAL CA SCREEN DOC REV: CPT | Performed by: FAMILY MEDICINE

## 2022-08-22 PROCEDURE — G8420 CALC BMI NORM PARAMETERS: HCPCS | Performed by: FAMILY MEDICINE

## 2022-08-22 PROCEDURE — 99213 OFFICE O/P EST LOW 20 MIN: CPT | Performed by: FAMILY MEDICINE

## 2022-08-22 RX ORDER — ZOSTER VACCINE RECOMBINANT, ADJUVANTED 50 MCG/0.5
0.5 KIT INTRAMUSCULAR SEE ADMIN INSTRUCTIONS
Qty: 0.5 ML | Refills: 0 | Status: SHIPPED | OUTPATIENT
Start: 2022-08-22 | End: 2023-02-18

## 2022-08-22 RX ORDER — PRIMIDONE 50 MG/1
50 TABLET ORAL 3 TIMES DAILY PRN
Qty: 30 TABLET | Refills: 1 | Status: SHIPPED | OUTPATIENT
Start: 2022-08-22

## 2022-08-22 SDOH — ECONOMIC STABILITY: FOOD INSECURITY: WITHIN THE PAST 12 MONTHS, THE FOOD YOU BOUGHT JUST DIDN'T LAST AND YOU DIDN'T HAVE MONEY TO GET MORE.: PATIENT DECLINED

## 2022-08-22 SDOH — ECONOMIC STABILITY: FOOD INSECURITY: WITHIN THE PAST 12 MONTHS, YOU WORRIED THAT YOUR FOOD WOULD RUN OUT BEFORE YOU GOT MONEY TO BUY MORE.: PATIENT DECLINED

## 2022-08-22 ASSESSMENT — ENCOUNTER SYMPTOMS
SHORTNESS OF BREATH: 0
CHEST TIGHTNESS: 0
WHEEZING: 0
COUGH: 0
ABDOMINAL PAIN: 0
BACK PAIN: 0

## 2022-08-22 ASSESSMENT — SOCIAL DETERMINANTS OF HEALTH (SDOH): HOW HARD IS IT FOR YOU TO PAY FOR THE VERY BASICS LIKE FOOD, HOUSING, MEDICAL CARE, AND HEATING?: PATIENT DECLINED

## 2022-08-22 NOTE — PROGRESS NOTES
Chief Complaint   Patient presents with    Medicare AWV     Patient is here for annual wellness visit    Tremors     He has some moderate worsening of benign essential tremor, worse off and on, asks about meds    Other     Left ear with wax? Wears hearing aids, washes it out at home       [place] NARRATIVE:    With recurrent symptoms in left ear, he has had fullness and wax in there before. He tries to keep it clean at home with a squirt gun but he wears a hearing aid so is afraid is impacted. He also has slight worsening of his bilateral hand tremor. It occurs intermittently but is a little more bothersome than in the past.  He does not lose his  on coffee cups etc. he does not have trouble with handwriting. Just occasional tremor. He would consider medication but does not want referral.    He is kind enough to bring his labs from the [place] which will be copied and abstracted. Patient is established unless otherwise noted. Above chief complaint and [place] obtained by this physician provider. Review of Systems   Constitutional:  Negative for activity change, chills, fatigue and fever. HENT:  Positive for hearing loss. Negative for congestion. Respiratory:  Negative for cough, chest tightness, shortness of breath and wheezing. Cardiovascular:  Negative for chest pain and leg swelling. Gastrointestinal:  Negative for abdominal pain. Musculoskeletal:  Negative for back pain and myalgias. Skin:  Negative for rash. Neurological:  Positive for tremors (both hands). Psychiatric/Behavioral:  Negative for behavioral problems and dysphoric mood. No Known Allergies  Allergy historyupdated.     Outpatient Medications Marked as Taking for the 8/22/22 encounter (Office Visit) with Cathleen Muse MD   Medication Sig Dispense Refill    zoster recombinant adjuvanted vaccine [facility]) 50 MCG/0.5ML SUSR injection Inject 0.5 mLs into the muscle See Admin Instructions 1 dose now and repeat in 2-6 months 0.5 mL 0    primidone (MYSOLINE) 50 MG tablet Take 1 tablet by mouth 3 times daily as needed (tremor) 30 tablet 1    fluticasone (FLONASE) 50 MCG/ACT nasal spray 2 sprays by Each Nostril route daily 3 Bottle 1    dutasteride-tamsulosin 0.5-0.4 MG CAPS Take 1 tablet by mouth daily. therapeutic multivitamin-minerals (THERAGRAN-M) tablet Take 1 tablet by mouth daily. Last dose 11/28/11. Tobacco use history updated. Social History     Tobacco Use   Smoking Status Never   Smokeless Tobacco Never        Nursing note reviewed. Vitals:    08/22/22 1324   BP: 110/62   Site: Left Upper Arm   Position: Sitting   Cuff Size: Medium Adult   Pulse: 73   SpO2: 94%   Weight: 187 lb (84.8 kg)   Height: 6' 1\" (1.854 m)          BP Readings from Last 3 Encounters:   08/22/22 110/62   07/05/22 131/78   08/19/21 130/82     Wt Readings from Last 3 Encounters:   08/22/22 187 lb (84.8 kg)   06/27/22 185 lb (83.9 kg)   08/19/21 184 lb 12.8 oz (83.8 kg)     Body mass index is 24.67 kg/m². No results found for this visit on 08/22/22. Physical Exam  Vitals and nursing note reviewed. Constitutional:       General: He is not in acute distress. Appearance: He is well-developed. He is not diaphoretic. HENT:      Head: Normocephalic and atraumatic. Right Ear: Tympanic membrane and ear canal normal.      Left Ear: There is impacted cerumen. Nose: Nose normal.   Eyes:      Conjunctiva/sclera: Conjunctivae normal.      Pupils: Pupils are equal, round, and reactive to light. Cardiovascular:      Rate and Rhythm: Normal rate and regular rhythm. Heart sounds: Normal heart sounds. No murmur heard. No friction rub. Pulmonary:      Effort: Pulmonary effort is normal. No respiratory distress. Breath sounds: Normal breath sounds. No wheezing. Musculoskeletal:         General: Normal range of motion. Cervical back: Normal range of motion and neck supple.    Skin:     General: Skin is warm and dry.      Coloration: Skin is not jaundiced. Neurological:      Mental Status: He is alert and oriented to person, place, and time. Psychiatric:         Mood and Affect: Mood normal.         Behavior: Behavior normal.         Thought Content: Thought content normal.     Ceruminosis is noted. Wax is removed by manual debridement with curette and q tip. Instructions for home care to prevent wax buildup are given.      _________________________________________________  Assessment:     1. Medicare annual wellness visit, subsequent  2. Tremor  3. Ear fullness, left  -     IL REMOVAL IMPACTED CERUMEN INSTRUMENTATION UNILAT  4. Cerumen debris on tympanic membrane, left  -     IL REMOVAL IMPACTED CERUMEN INSTRUMENTATION UNILAT  5. Hearing loss of left ear, unspecified hearing loss type  -     IL REMOVAL IMPACTED CERUMEN INSTRUMENTATION UNILAT    Problems listed above are stable and therapeutic plan is unchanged unless otherwise specified. See orders above and comments below for details of workup or medication orders. _________________________________________________  Plan:     Return if symptoms worsen or fail to improve, for Medicare Annual Wellness Visit in 1 year.       Electronically signed by Gerard Weaver MD on 8/22/22 at 2:10 PM EDT

## 2022-08-22 NOTE — PROGRESS NOTES
Medicare Annual Wellness Visit    Alejandra Armenta is here for Medicare AWV (Patient is here for annual wellness visit), Tremors (He has some moderate worsening of benign essential tremor, worse off and on, asks about meds), and Other (Left ear with wax? Wears hearing aids, washes it out at home)      SUBJECTIVE---------------------------------------------------------------------------------------------    Chief Complaint   Patient presents with    Medicare AWV     Patient is here for annual wellness visit    Tremors     He has some moderate worsening of benign essential tremor, worse off and on, asks about meds    Other     Left ear with wax? Wears hearing aids, washes it out at home        Patient's complete Health Risk Assessment and screening values have been reviewed and are found in Flowsheets. The following problems were reviewed today and where indicated follow up appointments were made and/or referrals ordered. Findings noted upon review of Medicare Annual Wellness Visit [facility] [place] Report:  [place] risk screening, cognitive screening, depression screening is all negative. Patient does not consume alcohol and no drug use is reported. HRA questionnaire is reviewed and all findings are normal or appropriate. He does have a living will which is scanned to file. Primary decision maker is wife Wong Zhang. Outside laboratory results are reviewed and will be abstracted and a copy from the [place] scanned. All appear to be normal.  Has seen derm for skin lesion. Wants ear checked for wax buildup wears hearing aid. C-scope done last month was good. He is on medication for prostate and otherwise no other meds needs or diagnoses need addressed. Positive Risk Factor Screenings with Interventions:                  No Positive Risk Factors identified today.        OBJECTIVE----------------------------------------------------------------------------------------------  Objective   Vitals:    08/22/22 4201 BP: 110/62   Site: Left Upper Arm   Position: Sitting   Cuff Size: Medium Adult   Pulse: 73   SpO2: 94%   Weight: 187 lb (84.8 kg)   Height: 6' 1\" (1.854 m)      Body mass index is 24.67 kg/m². PHYSICAL EXAM IS DEFERRED TODAY unless performed in Problem Based Service documented in another section of this encounter. No Known Allergies  Prior to Visit Medications    Medication Sig Taking? Authorizing Provider   zoster recombinant adjuvanted vaccine [facility]) 50 MCG/0.5ML SUSR injection Inject 0.5 mLs into the muscle See Admin Instructions 1 dose now and repeat in 2-6 months Yes Snehal Lion MD   primidone (MYSOLINE) 50 MG tablet Take 1 tablet by mouth 3 times daily as needed (tremor) Yes Snehal Lion MD   fluticasone (FLONASE) 50 MCG/ACT nasal spray 2 sprays by Each Nostril route daily Yes Snehal Lion MD   dutasteride-tamsulosin 0.5-0.4 MG CAPS Take 1 tablet by mouth daily. Yes Historical Provider, MD   therapeutic multivitamin-minerals (THERAGRAN-M) tablet Take 1 tablet by mouth daily. Last dose 11/28/11.   Yes Historical Provider, MD Tracey (Including outside providers/suppliers regularly involved in providing care):   Patient Care Team:  Snehal Lion MD as PCP - General (Family Medicine)  Snehal Lion MD as PCP - [facility]     Reviewed and updated this visit:  Tobacco  Allergies  Meds  Problems  Med Hx  Surg Hx  Soc Hx  Fam Hx                 ASSESSMENT/PLAN--------------------------------------------------------------------------------    Medicare annual wellness visit, subsequent  Tremor  Ear fullness, left  Cerumen debris on tympanic membrane, left  Hearing loss of left ear, unspecified hearing loss type        Recommendations for Preventive Services Due: see orders and patient instructions/AVS.    Recommended screening schedule for the next 5-10 years is provided to the patient in written form: see Patient Instructions/AVS.       Return for [facility] Annual Wellness Visit in 1 year.

## 2022-08-22 NOTE — PATIENT INSTRUCTIONS
Personalized Preventive Plan for Diane Gloss - 8/22/2022  Medicare offers a range of preventive health benefits. Some of the tests and screenings are paid in full while other may be subject to a deductible, co-insurance, and/or copay. Some of these benefits include a comprehensive review of your medical history including lifestyle, illnesses that may run in your family, and various assessments and screenings as appropriate. After reviewing your medical record and screening and assessments performed today your provider may have ordered immunizations, labs, imaging, and/or referrals for you. A list of these orders (if applicable) as well as your Preventive Care list are included within your After Visit Summary for your review. Other Preventive Recommendations:    A preventive eye exam performed by an eye specialist is recommended every 1-2 years to screen for glaucoma; cataracts, macular degeneration, and other eye disorders. A preventive dental visit is recommended every 6 months. Try to get at least 150 minutes of exercise per week or 10,000 steps per day on a pedometer . Order or download the FREE \"Exercise & Physical Activity: Your Everyday Guide\" from [employer]. Call 1-926.125.2910 or search [employer] online. You need 1060-0431 mg of calcium and 1539-5371 IU of vitamin D per day. It is possible to meet your calcium requirement with diet alone, but a vitamin D supplement is usually necessary to meet this goal.  When exposed to the sun, use a sunscreen that protects against both UVA and UVB radiation with an SPF of 30 or greater. Reapply every 2 to 3 hours or after sweating, drying off with a towel, or swimming. Always wear a seat belt when traveling in a car. Always wear a helmet when riding a bicycle or motorcycle.

## 2022-11-02 ENCOUNTER — HOSPITAL ENCOUNTER (OUTPATIENT)
Dept: CT IMAGING | Age: 77
Discharge: HOME | End: 2022-11-02
Payer: MEDICARE

## 2022-11-02 ENCOUNTER — HOSPITAL ENCOUNTER (OUTPATIENT)
Age: 77
Discharge: HOME | End: 2022-11-02
Payer: MEDICARE

## 2022-11-02 DIAGNOSIS — R31.0 GROSS HEMATURIA: ICD-10-CM

## 2022-11-02 LAB
EGFR, POC: >60 ML/MIN/1.73M2
POC CREATININE: 1.1 MG/DL (ref 0.9–1.3)

## 2022-11-02 PROCEDURE — 84153 ASSAY OF PSA TOTAL: CPT

## 2022-11-02 PROCEDURE — 36415 COLL VENOUS BLD VENIPUNCTURE: CPT

## 2022-11-02 PROCEDURE — 84154 ASSAY OF PSA FREE: CPT

## 2022-11-02 PROCEDURE — 6360000004 HC RX CONTRAST MEDICATION: Performed by: SPECIALIST

## 2022-11-02 PROCEDURE — 74178 CT ABD&PLV WO CNTR FLWD CNTR: CPT

## 2022-11-02 RX ORDER — SODIUM CHLORIDE 0.9 % (FLUSH) 0.9 %
5-40 SYRINGE (ML) INJECTION PRN
Status: DISCONTINUED | OUTPATIENT
Start: 2022-11-02 | End: 2022-11-03 | Stop reason: HOSPADM

## 2022-11-02 RX ADMIN — IOPAMIDOL 80 ML: 755 INJECTION, SOLUTION INTRAVENOUS at 08:35

## 2022-11-04 LAB
PROSTATE SPECIFIC ANTIGEN FREE: 1.5 NG/ML
PROSTATE SPECIFIC ANTIGEN PERCENT FREE: 20 %
PROSTATE SPECIFIC ANTIGEN: 7.5 NG/ML (ref 0–4)

## 2023-04-10 LAB
BASOPHILS ABSOLUTE: 0.1 /ΜL
BASOPHILS RELATIVE PERCENT: 0.7 %
EOSINOPHILS ABSOLUTE: 0.1 /ΜL
EOSINOPHILS RELATIVE PERCENT: 0.6 %
HCT VFR BLD CALC: 46 % (ref 41–53)
HEMOGLOBIN: 15.6 G/DL (ref 13.5–17.5)
LYMPHOCYTES ABSOLUTE: 1.5 /ΜL
LYMPHOCYTES RELATIVE PERCENT: 17.3 %
MCH RBC QN AUTO: 34 PG
MCHC RBC AUTO-ENTMCNC: 88 G/DL
MCV RBC AUTO: 29.9 FL
MONOCYTES ABSOLUTE: 0.7 /ΜL
MONOCYTES RELATIVE PERCENT: 8 %
NEUTROPHILS ABSOLUTE: 6.3 /ΜL
NEUTROPHILS RELATIVE PERCENT: 73.4 %
PLATELET # BLD: 274 K/ΜL
PMV BLD AUTO: 8.5 FL
RBC # BLD: 5.22 10^6/ΜL
WBC # BLD: 8.6 10^3/ML

## 2023-04-12 LAB
ALBUMIN SERPL-MCNC: 3.8 G/DL
ALP BLD-CCNC: 82 U/L
ALT SERPL-CCNC: 18 U/L
ANION GAP SERPL CALCULATED.3IONS-SCNC: NORMAL MMOL/L
AST SERPL-CCNC: 27 U/L
BILIRUB SERPL-MCNC: 1 MG/DL (ref 0.1–1.4)
BUN BLDV-MCNC: 14 MG/DL
CALCIUM SERPL-MCNC: 9.3 MG/DL
CHLORIDE BLD-SCNC: 107 MMOL/L
CHOLESTEROL, TOTAL: 233 MG/DL
CHOLESTEROL/HDL RATIO: ABNORMAL
CO2: 29 MMOL/L
CREAT SERPL-MCNC: 0.9 MG/DL
EGFR: 60
GLUCOSE BLD-MCNC: 82 MG/DL
HDLC SERPL-MCNC: 46 MG/DL (ref 35–70)
LDL CHOLESTEROL CALCULATED: 170 MG/DL (ref 0–160)
NONHDLC SERPL-MCNC: 187 MG/DL
POTASSIUM SERPL-SCNC: 4.2 MMOL/L
SODIUM BLD-SCNC: 140 MMOL/L
TOTAL PROTEIN: 6.7
TRIGL SERPL-MCNC: 84 MG/DL
VLDLC SERPL CALC-MCNC: ABNORMAL MG/DL

## 2023-08-23 SDOH — ECONOMIC STABILITY: TRANSPORTATION INSECURITY
IN THE PAST 12 MONTHS, HAS LACK OF TRANSPORTATION KEPT YOU FROM MEETINGS, WORK, OR FROM GETTING THINGS NEEDED FOR DAILY LIVING?: NO

## 2023-08-23 SDOH — ECONOMIC STABILITY: FOOD INSECURITY: WITHIN THE PAST 12 MONTHS, THE FOOD YOU BOUGHT JUST DIDN'T LAST AND YOU DIDN'T HAVE MONEY TO GET MORE.: NEVER TRUE

## 2023-08-23 SDOH — ECONOMIC STABILITY: INCOME INSECURITY: HOW HARD IS IT FOR YOU TO PAY FOR THE VERY BASICS LIKE FOOD, HOUSING, MEDICAL CARE, AND HEATING?: NOT HARD AT ALL

## 2023-08-23 SDOH — HEALTH STABILITY: PHYSICAL HEALTH: ON AVERAGE, HOW MANY DAYS PER WEEK DO YOU ENGAGE IN MODERATE TO STRENUOUS EXERCISE (LIKE A BRISK WALK)?: 4 DAYS

## 2023-08-23 SDOH — ECONOMIC STABILITY: FOOD INSECURITY: WITHIN THE PAST 12 MONTHS, YOU WORRIED THAT YOUR FOOD WOULD RUN OUT BEFORE YOU GOT MONEY TO BUY MORE.: NEVER TRUE

## 2023-08-23 SDOH — ECONOMIC STABILITY: HOUSING INSECURITY
IN THE LAST 12 MONTHS, WAS THERE A TIME WHEN YOU DID NOT HAVE A STEADY PLACE TO SLEEP OR SLEPT IN A SHELTER (INCLUDING NOW)?: NO

## 2023-08-23 SDOH — HEALTH STABILITY: PHYSICAL HEALTH: ON AVERAGE, HOW MANY MINUTES DO YOU ENGAGE IN EXERCISE AT THIS LEVEL?: 10 MIN

## 2023-08-23 ASSESSMENT — LIFESTYLE VARIABLES
HOW OFTEN DO YOU HAVE SIX OR MORE DRINKS ON ONE OCCASION: 1
HOW MANY STANDARD DRINKS CONTAINING ALCOHOL DO YOU HAVE ON A TYPICAL DAY: PATIENT DOES NOT DRINK
HOW MANY STANDARD DRINKS CONTAINING ALCOHOL DO YOU HAVE ON A TYPICAL DAY: 0
HOW OFTEN DO YOU HAVE A DRINK CONTAINING ALCOHOL: 1
HOW OFTEN DO YOU HAVE A DRINK CONTAINING ALCOHOL: NEVER

## 2023-08-23 ASSESSMENT — PATIENT HEALTH QUESTIONNAIRE - PHQ9
SUM OF ALL RESPONSES TO PHQ QUESTIONS 1-9: 0
SUM OF ALL RESPONSES TO PHQ QUESTIONS 1-9: 0
2. FEELING DOWN, DEPRESSED OR HOPELESS: 0
SUM OF ALL RESPONSES TO PHQ QUESTIONS 1-9: 0
SUM OF ALL RESPONSES TO PHQ9 QUESTIONS 1 & 2: 0
SUM OF ALL RESPONSES TO PHQ QUESTIONS 1-9: 0
1. LITTLE INTEREST OR PLEASURE IN DOING THINGS: 0

## 2023-08-24 ENCOUNTER — OFFICE VISIT (OUTPATIENT)
Dept: FAMILY MEDICINE CLINIC | Age: 78
End: 2023-08-24

## 2023-08-24 VITALS
SYSTOLIC BLOOD PRESSURE: 122 MMHG | BODY MASS INDEX: 24.39 KG/M2 | HEIGHT: 73 IN | WEIGHT: 184 LBS | DIASTOLIC BLOOD PRESSURE: 72 MMHG | HEART RATE: 75 BPM | OXYGEN SATURATION: 95 %

## 2023-08-24 DIAGNOSIS — E78.2 MIXED HYPERLIPIDEMIA: ICD-10-CM

## 2023-08-24 DIAGNOSIS — Z00.00 MEDICARE ANNUAL WELLNESS VISIT, SUBSEQUENT: Primary | ICD-10-CM

## 2023-08-24 DIAGNOSIS — R25.1 TREMOR: ICD-10-CM

## 2023-08-24 DIAGNOSIS — N13.8 BENIGN PROSTATIC HYPERPLASIA WITH URINARY OBSTRUCTION: ICD-10-CM

## 2023-08-24 DIAGNOSIS — N40.1 BENIGN PROSTATIC HYPERPLASIA WITH URINARY OBSTRUCTION: ICD-10-CM

## 2023-08-24 RX ORDER — ZOSTER VACCINE RECOMBINANT, ADJUVANTED 50 MCG/0.5
0.5 KIT INTRAMUSCULAR SEE ADMIN INSTRUCTIONS
Qty: 0.5 ML | Refills: 0 | Status: CANCELLED | OUTPATIENT
Start: 2023-08-24 | End: 2024-02-20

## 2023-09-03 ASSESSMENT — ENCOUNTER SYMPTOMS
ABDOMINAL PAIN: 0
SHORTNESS OF BREATH: 0
COUGH: 0
CHEST TIGHTNESS: 0
WHEEZING: 0
BACK PAIN: 0

## 2023-09-06 ENCOUNTER — NURSE ONLY (OUTPATIENT)
Dept: FAMILY MEDICINE CLINIC | Age: 78
End: 2023-09-06
Payer: MEDICARE

## 2023-09-06 DIAGNOSIS — E78.2 MIXED HYPERLIPIDEMIA: ICD-10-CM

## 2023-09-06 LAB
ALBUMIN SERPL-MCNC: 4 G/DL (ref 3.4–5)
ALBUMIN/GLOB SERPL: 1.6 {RATIO} (ref 1.1–2.2)
ALP SERPL-CCNC: 93 U/L (ref 40–129)
ALT SERPL-CCNC: 10 U/L (ref 10–40)
ANION GAP SERPL CALCULATED.3IONS-SCNC: 7 MMOL/L (ref 3–16)
AST SERPL-CCNC: 17 U/L (ref 15–37)
BILIRUB SERPL-MCNC: 0.7 MG/DL (ref 0–1)
BUN SERPL-MCNC: 16 MG/DL (ref 7–20)
CALCIUM SERPL-MCNC: 9.5 MG/DL (ref 8.3–10.6)
CHLORIDE SERPL-SCNC: 104 MMOL/L (ref 99–110)
CHOLEST SERPL-MCNC: 231 MG/DL (ref 0–199)
CO2 SERPL-SCNC: 29 MMOL/L (ref 21–32)
CREAT SERPL-MCNC: 1 MG/DL (ref 0.8–1.3)
GFR SERPLBLD CREATININE-BSD FMLA CKD-EPI: >60 ML/MIN/{1.73_M2}
GLUCOSE P FAST SERPL-MCNC: 80 MG/DL (ref 70–99)
HDLC SERPL-MCNC: 48 MG/DL (ref 40–60)
LDL CHOLESTEROL CALCULATED: 164 MG/DL
POTASSIUM SERPL-SCNC: 4.5 MMOL/L (ref 3.5–5.1)
PROT SERPL-MCNC: 6.5 G/DL (ref 6.4–8.2)
SODIUM SERPL-SCNC: 140 MMOL/L (ref 136–145)
TRIGL SERPL-MCNC: 95 MG/DL (ref 0–150)
VLDLC SERPL CALC-MCNC: 19 MG/DL

## 2023-09-06 PROCEDURE — 36415 COLL VENOUS BLD VENIPUNCTURE: CPT | Performed by: FAMILY MEDICINE

## 2023-09-07 NOTE — RESULT ENCOUNTER NOTE
Cholesterol profile shows elevation of total cholesterol and also of LDL cholesterol. Metabolic panel is all normal with respect to electrolytes sugar and liver and kidney function. Cardiac risk score is elevated at 24% so we would probably recommend he be on a statin medication to lower his LDL cholesterol. Cardiac event and stroke risk is significant at 24% risk in the next 10 years for 1 or the other. Please see if he is agreeable if so let me know I will send him Crestor 10 mg to his selected pharmacy. If he prefers dietary handout you can send those and we can recheck it next year.     The 10-year ASCVD risk score (Farnaz FRANCOIS, et al., 2019) is: 24.3%    Values used to calculate the score:      Age: 78 years      Sex: Male      Is Non- : No      Diabetic: No      Tobacco smoker: No      Systolic Blood Pressure: 074 mmHg      Is BP treated: No      HDL Cholesterol: 48 mg/dL      Total Cholesterol: 231 mg/dL

## 2023-09-08 ENCOUNTER — TELEPHONE (OUTPATIENT)
Dept: FAMILY MEDICINE CLINIC | Age: 78
End: 2023-09-08

## 2023-09-08 RX ORDER — ROSUVASTATIN CALCIUM 10 MG/1
10 TABLET, COATED ORAL DAILY
Qty: 30 TABLET | Refills: 5 | Status: SHIPPED | OUTPATIENT
Start: 2023-09-08

## 2023-09-08 NOTE — TELEPHONE ENCOUNTER
Pt came into office today. He is requesting Crestor be called into [facility] on [address]. He stated you had spoke to him about starting him on this at his last visit.  Please advise

## 2023-10-27 ENCOUNTER — TELEPHONE (OUTPATIENT)
Dept: FAMILY MEDICINE CLINIC | Age: 78
End: 2023-10-27

## 2023-10-27 NOTE — TELEPHONE ENCOUNTER
It was sent as requested, pharmacy told to inform patient to hold his rosuvastatin and similar message to patient

## 2023-10-27 NOTE — TELEPHONE ENCOUNTER
Symptoms, Started 10/26/23, sore throat, temperature, fatigue.  Stated that he tested + on 10/27/23, requested Paxlovid to be sent to [facility] in [place]

## 2023-11-06 ENCOUNTER — TELEPHONE (OUTPATIENT)
Dept: FAMILY MEDICINE CLINIC | Age: 78
End: 2023-11-06

## 2023-11-06 NOTE — TELEPHONE ENCOUNTER
----- Message from Dwarf Cogan sent at 11/6/2023 10:17 AM EST -----  Subject: Message to Provider    QUESTIONS  Information for Provider? Patient wanted to confirm if he is ok to receive   flu shot after having covid-19 & shingles. ---------------------------------------------------------------------------  --------------  Rolanda [facility]  [phone]; OK to leave message on voicemail  ---------------------------------------------------------------------------  --------------  SCRIPT ANSWERS  Relationship to Patient?  Self

## 2023-12-05 ENCOUNTER — OFFICE VISIT (OUTPATIENT)
Dept: FAMILY MEDICINE CLINIC | Age: 78
End: 2023-12-05
Payer: MEDICARE

## 2023-12-05 VITALS
OXYGEN SATURATION: 94 % | BODY MASS INDEX: 23.22 KG/M2 | SYSTOLIC BLOOD PRESSURE: 118 MMHG | HEART RATE: 75 BPM | DIASTOLIC BLOOD PRESSURE: 74 MMHG | WEIGHT: 176 LBS

## 2023-12-05 DIAGNOSIS — Z29.11 NEED FOR RSV IMMUNIZATION: ICD-10-CM

## 2023-12-05 DIAGNOSIS — N40.1 BENIGN PROSTATIC HYPERPLASIA WITH URINARY OBSTRUCTION: ICD-10-CM

## 2023-12-05 DIAGNOSIS — B02.29 POSTHERPETIC NEURALGIA: ICD-10-CM

## 2023-12-05 DIAGNOSIS — Z23 NEED FOR INFLUENZA VACCINATION: Primary | ICD-10-CM

## 2023-12-05 DIAGNOSIS — N13.8 BENIGN PROSTATIC HYPERPLASIA WITH URINARY OBSTRUCTION: ICD-10-CM

## 2023-12-05 PROCEDURE — G8420 CALC BMI NORM PARAMETERS: HCPCS | Performed by: FAMILY MEDICINE

## 2023-12-05 PROCEDURE — 1036F TOBACCO NON-USER: CPT | Performed by: FAMILY MEDICINE

## 2023-12-05 PROCEDURE — G8427 DOCREV CUR MEDS BY ELIG CLIN: HCPCS | Performed by: FAMILY MEDICINE

## 2023-12-05 PROCEDURE — 99213 OFFICE O/P EST LOW 20 MIN: CPT | Performed by: FAMILY MEDICINE

## 2023-12-05 PROCEDURE — 1123F ACP DISCUSS/DSCN MKR DOCD: CPT | Performed by: FAMILY MEDICINE

## 2023-12-05 PROCEDURE — G8484 FLU IMMUNIZE NO ADMIN: HCPCS | Performed by: FAMILY MEDICINE

## 2023-12-05 RX ORDER — GABAPENTIN 100 MG/1
CAPSULE ORAL
Qty: 90 CAPSULE | Refills: 2 | Status: SHIPPED | OUTPATIENT
Start: 2023-12-05 | End: 2024-01-04

## 2023-12-05 RX ORDER — PREDNISONE 10 MG/1
40 TABLET ORAL DAILY
COMMUNITY
Start: 2023-09-19 | End: 2023-12-05

## 2023-12-05 RX ORDER — TRIAMCINOLONE ACETONIDE 0.25 MG/G
CREAM TOPICAL
COMMUNITY
Start: 2023-11-27 | End: 2023-12-05

## 2023-12-05 RX ORDER — PREDNISOLONE ACETATE 10 MG/ML
1 SUSPENSION/ DROPS OPHTHALMIC 4 TIMES DAILY
COMMUNITY
Start: 2023-09-19 | End: 2023-12-05

## 2023-12-05 RX ORDER — TAMSULOSIN HYDROCHLORIDE 0.4 MG/1
0.08 CAPSULE ORAL DAILY
COMMUNITY

## 2023-12-05 NOTE — PROGRESS NOTES
Chief Complaint   Patient presents with    Numbness     Facial numbness post shingle outbreak. Positive For Covid-19     Also recently diagnosed with COVID illness, symptoms are mostly resolved       [place] NARRATIVE:    Patient is following up for facial numbness. I am able to view at [facility] visit from 9/19/2023 when he was diagnosed with herpes zoster conjunctivitis. Patient also has discontinued crestor. Weaned off valacyclovir down titration (done) with ophthalmology, triamcinolone cream from derm    Also fortunately got covid after eye symptoms started. Patient is established unless otherwise noted. Above chief complaint and [place] obtained by this physician provider. Review of Systems   Constitutional:  Negative for activity change, chills, fatigue and fever. HENT:  Negative for congestion. Eyes:  Positive for pain (No pain but facial numbness). Respiratory:  Negative for cough, chest tightness, shortness of breath and wheezing. Cardiovascular:  Negative for chest pain and leg swelling. Gastrointestinal:  Negative for abdominal pain. Musculoskeletal:  Negative for back pain and myalgias. Skin:  Negative for rash. Psychiatric/Behavioral:  Negative for behavioral problems and dysphoric mood. No Known Allergies  Allergy historyupdated. Outpatient Medications Marked as Taking for the 12/5/23 encounter (Office Visit) with Uzma Bridges MD   Medication Sig Dispense Refill    tamsulosin (FLOMAX) 0.4 MG capsule Take 0.08 mg by mouth daily      gabapentin (NEURONTIN) 100 MG capsule Take 1 capsule by mouth nightly for 3 days, THEN 1 capsule in the morning and at bedtime for 3 days, THEN 1 capsule 3 times daily for 24 days. Intended supply: 90 days. 90 capsule 2    dutasteride-tamsulosin 0.5-0.4 MG CAPS Take 1 tablet by mouth daily. therapeutic multivitamin-minerals (THERAGRAN-M) tablet Take 1 tablet by mouth daily Last dose 11/28/11.          Tobacco use

## 2024-03-04 DIAGNOSIS — B02.29 POSTHERPETIC NEURALGIA: ICD-10-CM

## 2024-03-04 RX ORDER — GABAPENTIN 100 MG/1
CAPSULE ORAL
Qty: 90 CAPSULE | Refills: 0 | Status: SHIPPED | OUTPATIENT
Start: 2024-03-04 | End: 2024-06-02

## 2024-03-04 NOTE — TELEPHONE ENCOUNTER
Last appointment: 12/5/2023   Next Appointment: Visit date not found     Allergies:  No Known Allergies      Recent Vitals:  Wt Readings from Last 3 Encounters:   12/05/23 79.8 kg (176 lb)   08/24/23 83.5 kg (184 lb)   08/22/22 84.8 kg (187 lb)     Ht Readings from Last 3 Encounters:   08/24/23 1.854 m (6' 1\")   08/22/22 1.854 m (6' 1\")   06/27/22 1.854 m (6' 1\")     BP Readings from Last 3 Encounters:   12/05/23 118/74   08/24/23 122/72   08/22/22 110/62     BMI Readings from Last 3 Encounters:   12/05/23 23.22 kg/m²   08/24/23 24.28 kg/m²   08/22/22 24.67 kg/m²       Recent Labs__________________________________________________________________________    Renal:   Creatinine   Date Value Ref Range Status   09/06/2023 1.0 0.8 - 1.3 mg/dL Final     BUN   Date Value Ref Range Status   09/06/2023 16 7 - 20 mg/dL Final     Sodium   Date Value Ref Range Status   09/06/2023 140 136 - 145 mmol/L Final     Potassium   Date Value Ref Range Status   09/06/2023 4.5 3.5 - 5.1 mmol/L Final     Chloride   Date Value Ref Range Status   09/06/2023 104 99 - 110 mmol/L Final     CO2   Date Value Ref Range Status   09/06/2023 29 21 - 32 mmol/L Final       BMP:    Sodium   Date Value Ref Range Status   09/06/2023 140 136 - 145 mmol/L Final     Potassium   Date Value Ref Range Status   09/06/2023 4.5 3.5 - 5.1 mmol/L Final     Chloride   Date Value Ref Range Status   09/06/2023 104 99 - 110 mmol/L Final     CO2   Date Value Ref Range Status   09/06/2023 29 21 - 32 mmol/L Final     BUN   Date Value Ref Range Status   09/06/2023 16 7 - 20 mg/dL Final     Creatinine   Date Value Ref Range Status   09/06/2023 1.0 0.8 - 1.3 mg/dL Final     Glucose   Date Value Ref Range Status   04/12/2023 82 mg/dL Final     Calcium   Date Value Ref Range Status   09/06/2023 9.5 8.3 - 10.6 mg/dL Final     Est, Glomerular Filtration Rate   Date Value Ref Range Status   04/12/2023 60  Final     Est, Glom Filt Rate   Date Value Ref Range Status   09/06/2023 >60

## 2024-03-29 DIAGNOSIS — B02.29 POSTHERPETIC NEURALGIA: ICD-10-CM

## 2024-04-01 RX ORDER — GABAPENTIN 100 MG/1
100 CAPSULE ORAL 3 TIMES DAILY
Qty: 90 CAPSULE | Refills: 2 | Status: SHIPPED | OUTPATIENT
Start: 2024-04-01 | End: 2024-06-30

## 2024-04-03 LAB
CHOLESTEROL, TOTAL: 222 MG/DL
CHOLESTEROL/HDL RATIO: NORMAL
HDLC SERPL-MCNC: NORMAL MG/DL
LDL CHOLESTEROL: 154
NONHDLC SERPL-MCNC: 177 MG/DL
TRIGL SERPL-MCNC: 115 MG/DL
VLDLC SERPL CALC-MCNC: NORMAL MG/DL

## 2024-06-24 DIAGNOSIS — B02.29 POSTHERPETIC NEURALGIA: ICD-10-CM

## 2024-06-24 RX ORDER — GABAPENTIN 100 MG/1
100 CAPSULE ORAL 3 TIMES DAILY
Qty: 90 CAPSULE | Refills: 1 | Status: SHIPPED | OUTPATIENT
Start: 2024-06-24 | End: 2024-08-23

## 2024-06-24 NOTE — TELEPHONE ENCOUNTER
Last appointment: 12/5/2023   Next Appointment: Visit date not found     Allergies:  No Known Allergies      Recent Vitals:  Wt Readings from Last 3 Encounters:   12/05/23 79.8 kg (176 lb)   08/24/23 83.5 kg (184 lb)   08/22/22 84.8 kg (187 lb)     Ht Readings from Last 3 Encounters:   08/24/23 1.854 m (6' 1\")   08/22/22 1.854 m (6' 1\")   06/27/22 1.854 m (6' 1\")     BP Readings from Last 3 Encounters:   12/05/23 118/74   08/24/23 122/72   08/22/22 110/62     BMI Readings from Last 3 Encounters:   12/05/23 23.22 kg/m²   08/24/23 24.28 kg/m²   08/22/22 24.67 kg/m²       Recent Labs__________________________________________________________________________    Renal:   Creatinine   Date Value Ref Range Status   09/06/2023 1.0 0.8 - 1.3 mg/dL Final     BUN   Date Value Ref Range Status   09/06/2023 16 7 - 20 mg/dL Final     Sodium   Date Value Ref Range Status   09/06/2023 140 136 - 145 mmol/L Final     Potassium   Date Value Ref Range Status   09/06/2023 4.5 3.5 - 5.1 mmol/L Final     Chloride   Date Value Ref Range Status   09/06/2023 104 99 - 110 mmol/L Final     CO2   Date Value Ref Range Status   09/06/2023 29 21 - 32 mmol/L Final       BMP:    Sodium   Date Value Ref Range Status   09/06/2023 140 136 - 145 mmol/L Final     Potassium   Date Value Ref Range Status   09/06/2023 4.5 3.5 - 5.1 mmol/L Final     Chloride   Date Value Ref Range Status   09/06/2023 104 99 - 110 mmol/L Final     CO2   Date Value Ref Range Status   09/06/2023 29 21 - 32 mmol/L Final     BUN   Date Value Ref Range Status   09/06/2023 16 7 - 20 mg/dL Final     Creatinine   Date Value Ref Range Status   09/06/2023 1.0 0.8 - 1.3 mg/dL Final     Glucose   Date Value Ref Range Status   04/12/2023 82 mg/dL Final     Calcium   Date Value Ref Range Status   09/06/2023 9.5 8.3 - 10.6 mg/dL Final     Est, Glom Filt Rate   Date Value Ref Range Status   09/06/2023 >60 >60 Final     Comment:     Pediatric calculator

## 2024-07-01 ASSESSMENT — PATIENT HEALTH QUESTIONNAIRE - PHQ9
SUM OF ALL RESPONSES TO PHQ QUESTIONS 1-9: 0
2. FEELING DOWN, DEPRESSED OR HOPELESS: NOT AT ALL
SUM OF ALL RESPONSES TO PHQ9 QUESTIONS 1 & 2: 0
SUM OF ALL RESPONSES TO PHQ QUESTIONS 1-9: 0
2. FEELING DOWN, DEPRESSED OR HOPELESS: NOT AT ALL
1. LITTLE INTEREST OR PLEASURE IN DOING THINGS: NOT AT ALL
SUM OF ALL RESPONSES TO PHQ QUESTIONS 1-9: 0
SUM OF ALL RESPONSES TO PHQ9 QUESTIONS 1 & 2: 0
1. LITTLE INTEREST OR PLEASURE IN DOING THINGS: NOT AT ALL
SUM OF ALL RESPONSES TO PHQ QUESTIONS 1-9: 0

## 2024-07-02 ENCOUNTER — OFFICE VISIT (OUTPATIENT)
Dept: FAMILY MEDICINE CLINIC | Age: 79
End: 2024-07-02
Payer: MEDICARE

## 2024-07-02 VITALS
DIASTOLIC BLOOD PRESSURE: 80 MMHG | SYSTOLIC BLOOD PRESSURE: 122 MMHG | OXYGEN SATURATION: 97 % | HEIGHT: 73 IN | BODY MASS INDEX: 24.39 KG/M2 | HEART RATE: 84 BPM | WEIGHT: 184 LBS

## 2024-07-02 DIAGNOSIS — B02.29 POSTHERPETIC NEURALGIA: ICD-10-CM

## 2024-07-02 DIAGNOSIS — E78.2 MIXED HYPERLIPIDEMIA: Primary | ICD-10-CM

## 2024-07-02 DIAGNOSIS — Z51.81 MEDICATION MONITORING ENCOUNTER: ICD-10-CM

## 2024-07-02 DIAGNOSIS — Z29.11 NEED FOR RSV IMMUNIZATION: ICD-10-CM

## 2024-07-02 DIAGNOSIS — N13.8 BENIGN PROSTATIC HYPERPLASIA WITH URINARY OBSTRUCTION: ICD-10-CM

## 2024-07-02 DIAGNOSIS — N40.1 BENIGN PROSTATIC HYPERPLASIA WITH URINARY OBSTRUCTION: ICD-10-CM

## 2024-07-02 PROCEDURE — G8427 DOCREV CUR MEDS BY ELIG CLIN: HCPCS | Performed by: FAMILY MEDICINE

## 2024-07-02 PROCEDURE — 1036F TOBACCO NON-USER: CPT | Performed by: FAMILY MEDICINE

## 2024-07-02 PROCEDURE — 99214 OFFICE O/P EST MOD 30 MIN: CPT | Performed by: FAMILY MEDICINE

## 2024-07-02 PROCEDURE — 1123F ACP DISCUSS/DSCN MKR DOCD: CPT | Performed by: FAMILY MEDICINE

## 2024-07-02 PROCEDURE — G8420 CALC BMI NORM PARAMETERS: HCPCS | Performed by: FAMILY MEDICINE

## 2024-07-02 RX ORDER — DUTASTERIDE 0.5 MG/1
0.5 CAPSULE, LIQUID FILLED ORAL DAILY
COMMUNITY
Start: 2024-06-23

## 2024-07-02 NOTE — PROGRESS NOTES
Chief Complaint   Patient presents with    Medication Refill     Patient is here requesting refill, on gabapentin for peripheral neuropathy and postherpetic neuropathy    Cholesterol Problem     Patient with hyperlipidemia not on medication presently    Benign Prostatic Hypertrophy     Patient with BPH on medication from specialty care       [place] NARRATIVE:    Patient is here for 6-month recheck, he was last seen in December.  He has some baseline medical problems.  Reports he continues to have numbness and itching in right face after 10 mos ago shingles.  Taking ray two daily, to treat the symptoms.  But both anterior legs with numbness in legs and feet to be monitored.    Patient is established unless otherwise noted.  Above chief complaint and [place] obtained by this physician provider.     Review of Systems   Constitutional:  Negative for activity change, chills, fatigue and fever.   HENT:  Negative for congestion.    Respiratory:  Negative for cough, chest tightness, shortness of breath and wheezing.    Cardiovascular:  Negative for chest pain and leg swelling.   Gastrointestinal:  Negative for abdominal pain.   Musculoskeletal:  Negative for back pain and myalgias.   Skin:  Negative for rash.   Neurological:  Positive for numbness (With paresthesias in legs and also in face).   Psychiatric/Behavioral:  Negative for behavioral problems and dysphoric mood.        No Known Allergies  Allergy historyupdated.    Outpatient Medications Marked as Taking for the 24 encounter (Office Visit) with Hallie Mike MD   Medication Sig Dispense Refill    [] respiratory syncytial vaccine, adjuvanted (AREXVY) 120 MCG/0.5ML injection Inject 0.5 mLs into the muscle once for 1 dose (Can sub any covered RSV Vaccine) 0.5 mL 0    dutasteride (AVODART) 0.5 MG capsule Take 1 capsule by mouth daily      gabapentin (NEURONTIN) 100 MG capsule Take 1 capsule by mouth 3 times daily for 60 days. 90 capsule 1    tamsulosin (FLOMAX)

## 2024-07-08 ASSESSMENT — ENCOUNTER SYMPTOMS
WHEEZING: 0
CHEST TIGHTNESS: 0
BACK PAIN: 0
SHORTNESS OF BREATH: 0
ABDOMINAL PAIN: 0
COUGH: 0

## 2024-11-17 DIAGNOSIS — B02.29 POSTHERPETIC NEURALGIA: ICD-10-CM

## 2024-11-18 RX ORDER — GABAPENTIN 100 MG/1
100 CAPSULE ORAL 3 TIMES DAILY
Qty: 90 CAPSULE | Refills: 5 | Status: SHIPPED | OUTPATIENT
Start: 2024-11-18 | End: 2025-05-17

## 2025-02-13 DIAGNOSIS — B02.29 POSTHERPETIC NEURALGIA: ICD-10-CM

## 2025-02-13 RX ORDER — GABAPENTIN 100 MG/1
100 CAPSULE ORAL 3 TIMES DAILY
Qty: 90 CAPSULE | Refills: 5 | Status: SHIPPED | OUTPATIENT
Start: 2025-02-13 | End: 2025-08-12

## 2025-03-14 LAB
ALBUMIN: 3.6 G/DL
ALP BLD-CCNC: 82 U/L
ALT SERPL-CCNC: 14 U/L
ANION GAP SERPL CALCULATED.3IONS-SCNC: 6 MMOL/L
AST SERPL-CCNC: 20 U/L
BASOPHILS ABSOLUTE: 0.1 /ΜL
BASOPHILS RELATIVE PERCENT: 1.3 %
BILIRUB SERPL-MCNC: 0.9 MG/DL (ref 0.1–1.4)
BUN BLDV-MCNC: 16 MG/DL
CALCIUM SERPL-MCNC: 9 MG/DL
CHLORIDE BLD-SCNC: 110 MMOL/L
CHOLESTEROL, TOTAL: 212 MG/DL
CHOLESTEROL/HDL RATIO: NORMAL
CO2: 26 MMOL/L
CREAT SERPL-MCNC: 0.9 MG/DL
EOSINOPHILS ABSOLUTE: 0.2 /ΜL
EOSINOPHILS RELATIVE PERCENT: 2.1 %
GFR, ESTIMATED: NORMAL
GLUCOSE BLD-MCNC: 79 MG/DL
HCT VFR BLD CALC: 46.2 % (ref 41–53)
HDLC SERPL-MCNC: 41 MG/DL (ref 35–70)
HEMOGLOBIN: 15.5 G/DL (ref 13.5–17.5)
LDL CHOLESTEROL: 146
LYMPHOCYTES ABSOLUTE: 1.7 /ΜL
LYMPHOCYTES RELATIVE PERCENT: 24.4 %
MCH RBC QN AUTO: 29 PG
MCHC RBC AUTO-ENTMCNC: 33.5 G/DL
MCV RBC AUTO: 86.4 FL
MONOCYTES ABSOLUTE: 0.7 /ΜL
MONOCYTES RELATIVE PERCENT: 9.6 %
NEUTROPHILS ABSOLUTE: 4.3 /ΜL
NEUTROPHILS RELATIVE PERCENT: 62 %
NONHDLC SERPL-MCNC: 170 MG/DL
PLATELET # BLD: 316 K/ΜL
PMV BLD AUTO: 10.2 FL
POTASSIUM SERPL-SCNC: 4.3 MMOL/L
RBC # BLD: 5.35 10^6/ΜL
SODIUM BLD-SCNC: 142 MMOL/L
TOTAL PROTEIN: 6.7 G/DL (ref 6.4–8.2)
TRIGL SERPL-MCNC: 120 MG/DL
VLDLC SERPL CALC-MCNC: NORMAL MG/DL
WBC # BLD: 7 10^3/ML

## 2025-06-02 LAB
CHOLESTEROL, TOTAL: 135 MG/DL
CHOLESTEROL/HDL RATIO: NORMAL
HDLC SERPL-MCNC: 45 MG/DL (ref 35–70)
LDL CHOLESTEROL: 74
NONHDLC SERPL-MCNC: 90 MG/DL
TRIGL SERPL-MCNC: 76 MG/DL
VLDLC SERPL CALC-MCNC: NORMAL MG/DL

## 2025-07-06 SDOH — ECONOMIC STABILITY: FOOD INSECURITY: WITHIN THE PAST 12 MONTHS, THE FOOD YOU BOUGHT JUST DIDN'T LAST AND YOU DIDN'T HAVE MONEY TO GET MORE.: NEVER TRUE

## 2025-07-06 SDOH — ECONOMIC STABILITY: INCOME INSECURITY: IN THE LAST 12 MONTHS, WAS THERE A TIME WHEN YOU WERE NOT ABLE TO PAY THE MORTGAGE OR RENT ON TIME?: NO

## 2025-07-06 SDOH — HEALTH STABILITY: PHYSICAL HEALTH: ON AVERAGE, HOW MANY DAYS PER WEEK DO YOU ENGAGE IN MODERATE TO STRENUOUS EXERCISE (LIKE A BRISK WALK)?: 3 DAYS

## 2025-07-06 SDOH — HEALTH STABILITY: PHYSICAL HEALTH: ON AVERAGE, HOW MANY MINUTES DO YOU ENGAGE IN EXERCISE AT THIS LEVEL?: 10 MIN

## 2025-07-06 SDOH — ECONOMIC STABILITY: FOOD INSECURITY: WITHIN THE PAST 12 MONTHS, YOU WORRIED THAT YOUR FOOD WOULD RUN OUT BEFORE YOU GOT MONEY TO BUY MORE.: NEVER TRUE

## 2025-07-06 SDOH — ECONOMIC STABILITY: TRANSPORTATION INSECURITY
IN THE PAST 12 MONTHS, HAS THE LACK OF TRANSPORTATION KEPT YOU FROM MEDICAL APPOINTMENTS OR FROM GETTING MEDICATIONS?: NO

## 2025-07-06 ASSESSMENT — PATIENT HEALTH QUESTIONNAIRE - PHQ9
SUM OF ALL RESPONSES TO PHQ QUESTIONS 1-9: 0
2. FEELING DOWN, DEPRESSED OR HOPELESS: NOT AT ALL
SUM OF ALL RESPONSES TO PHQ QUESTIONS 1-9: 0
1. LITTLE INTEREST OR PLEASURE IN DOING THINGS: NOT AT ALL

## 2025-07-06 ASSESSMENT — LIFESTYLE VARIABLES
HOW OFTEN DO YOU HAVE SIX OR MORE DRINKS ON ONE OCCASION: 1
HOW MANY STANDARD DRINKS CONTAINING ALCOHOL DO YOU HAVE ON A TYPICAL DAY: PATIENT DOES NOT DRINK
HOW OFTEN DO YOU HAVE A DRINK CONTAINING ALCOHOL: 1
HOW MANY STANDARD DRINKS CONTAINING ALCOHOL DO YOU HAVE ON A TYPICAL DAY: 0
HOW OFTEN DO YOU HAVE A DRINK CONTAINING ALCOHOL: NEVER

## 2025-07-07 ENCOUNTER — OFFICE VISIT (OUTPATIENT)
Dept: FAMILY MEDICINE CLINIC | Age: 80
End: 2025-07-07

## 2025-07-07 VITALS
HEART RATE: 65 BPM | SYSTOLIC BLOOD PRESSURE: 122 MMHG | BODY MASS INDEX: 24.01 KG/M2 | OXYGEN SATURATION: 98 % | WEIGHT: 181.2 LBS | DIASTOLIC BLOOD PRESSURE: 62 MMHG | HEIGHT: 73 IN

## 2025-07-07 DIAGNOSIS — N13.8 BENIGN PROSTATIC HYPERPLASIA WITH URINARY OBSTRUCTION: ICD-10-CM

## 2025-07-07 DIAGNOSIS — R25.1 TREMOR: ICD-10-CM

## 2025-07-07 DIAGNOSIS — N40.1 BENIGN PROSTATIC HYPERPLASIA WITH URINARY OBSTRUCTION: ICD-10-CM

## 2025-07-07 DIAGNOSIS — E78.2 MIXED HYPERLIPIDEMIA: ICD-10-CM

## 2025-07-07 DIAGNOSIS — Z00.00 MEDICARE ANNUAL WELLNESS VISIT, SUBSEQUENT: Primary | ICD-10-CM

## 2025-07-07 DIAGNOSIS — B02.29 POSTHERPETIC NEURALGIA: ICD-10-CM

## 2025-07-07 DIAGNOSIS — Z29.11 NEED FOR RSV IMMUNIZATION: ICD-10-CM

## 2025-07-07 RX ORDER — ROSUVASTATIN CALCIUM 20 MG/1
20 TABLET, COATED ORAL EVERY EVENING
COMMUNITY
Start: 2025-06-05

## 2025-07-07 NOTE — PROGRESS NOTES
Medicare Annual Wellness Visit    Christo Weathers is here for Medicare AWV (Here for annual wellness visit service, due for several health maintenance interventions), Cholesterol Problem (Currently on q. OD statin, due for labs), Benign Prostatic Hypertrophy (Per urology, on med), and Neurologic Problem (Post herpetic neuralgia is improving)         Subjective     Chief Complaint   Patient presents with    Medicare AWV     Here for annual wellness visit service, due for several health maintenance interventions    Cholesterol Problem     Currently on q. OD statin, due for labs    Benign Prostatic Hypertrophy     Per urology, on med    Neurologic Problem     Post herpetic neuralgia is improving        Patient's complete Health Risk Assessment and screening values have been reviewed and are found in Flowsheets. The following problems were reviewed today and where indicated follow up appointments were made and/or referrals ordered.    Findings noted upon review of Medicare Annual Wellness Visit [facility] [place] Report: Cognitive screening is abnormal with respect to clock test.  Depression screen alcohol and drug screen are all negative.  HRA is normal except for no recent eye exam, this will be recommended.  He has wife listed as medical decision maker and does have documents on file.    Positive Risk Factor Screenings with Interventions:     Cognitive:   Clock Drawing Test (CDT): (!) Abnormal  Words recalled: 3 Words Recalled  Total Score: 3  Total Score Interpretation: Normal Mini-Cog  Interventions:  Discussed and stable                 Vision Screen:  Do you have difficulty driving, watching TV, or doing any of your daily activities because of your eyesight?: No  Have you had an eye exam within the past year?: (!) No  Interventions:   Patient encouraged to make appointment with their eye specialist                 Objective   Vitals:    07/07/25 1020   BP: 122/62   BP Site: Left Upper Arm   Patient Position: Sitting

## 2025-07-07 NOTE — PATIENT INSTRUCTIONS
the table; if food needs more flavor, get creative and try out different herbs and spices. Garlic and onion also add substantial flavor to foods.    Trim any visible fat off meat and poultry before cooking, and drain the fat off after [place].    Use cooking methods that require little or no added fat, such as grilling, boiling, baking, poaching, broiling, roasting, steaming, stir-frying, and sauting.    Avoid fast food and convenience food. They tend to be high in saturated and trans fat and have a lot of added salt.    Talk to a registered dietitian for individualized diet advice.      Last Reviewed: March 2011 Staci Minor MS, MPH, RD   Updated: 3/29/2011

## (undated) DEVICE — SNARE VASC L240CM LOOP W10MM SHTH DIA2.4MM RND STIFF CLD

## (undated) DEVICE — FORCEPS BX 240CM JAW 3.2MM L CAP NDL MIC MESH TTH M00513372

## (undated) DEVICE — ENDOSCOPY KIT: Brand: MEDLINE INDUSTRIES, INC.